# Patient Record
Sex: FEMALE | Race: WHITE | NOT HISPANIC OR LATINO | Employment: OTHER | URBAN - NONMETROPOLITAN AREA
[De-identification: names, ages, dates, MRNs, and addresses within clinical notes are randomized per-mention and may not be internally consistent; named-entity substitution may affect disease eponyms.]

---

## 2017-06-24 ENCOUNTER — HOSPITAL ENCOUNTER (EMERGENCY)
Facility: HOSPITAL | Age: 67
Discharge: HOME | End: 2017-06-25
Attending: EMERGENCY MEDICINE | Admitting: EMERGENCY MEDICINE

## 2017-06-24 ENCOUNTER — APPOINTMENT (OUTPATIENT)
Dept: GENERAL RADIOLOGY | Facility: HOSPITAL | Age: 67
End: 2017-06-24

## 2017-06-24 DIAGNOSIS — I10 ESSENTIAL HYPERTENSION: Primary | ICD-10-CM

## 2017-06-24 DIAGNOSIS — F41.9 ANXIETY: ICD-10-CM

## 2017-06-24 DIAGNOSIS — R07.89 ATYPICAL CHEST PAIN: ICD-10-CM

## 2017-06-24 LAB
ALBUMIN SERPL-MCNC: 4.4 G/DL (ref 3.4–4.8)
ALBUMIN/GLOB SERPL: 1.6 G/DL (ref 1.1–1.8)
ALP SERPL-CCNC: 104 U/L (ref 38–126)
ALT SERPL W P-5'-P-CCNC: 52 U/L (ref 9–52)
ANION GAP SERPL CALCULATED.3IONS-SCNC: 11 MMOL/L (ref 5–15)
AST SERPL-CCNC: 36 U/L (ref 14–36)
BACTERIA UR QL AUTO: ABNORMAL /HPF
BASOPHILS # BLD AUTO: 0.02 10*3/MM3 (ref 0–0.2)
BASOPHILS NFR BLD AUTO: 0.3 % (ref 0–2)
BILIRUB SERPL-MCNC: 0.6 MG/DL (ref 0.2–1.3)
BILIRUB UR QL STRIP: NEGATIVE
BUN BLD-MCNC: 15 MG/DL (ref 7–21)
BUN/CREAT SERPL: 22.4 (ref 7–25)
CALCIUM SPEC-SCNC: 9.6 MG/DL (ref 8.4–10.2)
CHLORIDE SERPL-SCNC: 101 MMOL/L (ref 95–110)
CK MB SERPL-CCNC: 1.12 NG/ML (ref 0–5)
CK MB SERPL-CCNC: 1.41 NG/ML (ref 0–5)
CK SERPL-CCNC: 50 U/L (ref 30–135)
CK SERPL-CCNC: 58 U/L (ref 30–135)
CLARITY UR: CLEAR
CO2 SERPL-SCNC: 29 MMOL/L (ref 22–31)
COLOR UR: YELLOW
CREAT BLD-MCNC: 0.67 MG/DL (ref 0.5–1)
DEPRECATED RDW RBC AUTO: 42.6 FL (ref 36.4–46.3)
EOSINOPHIL # BLD AUTO: 0.11 10*3/MM3 (ref 0–0.7)
EOSINOPHIL NFR BLD AUTO: 1.8 % (ref 0–7)
ERYTHROCYTE [DISTWIDTH] IN BLOOD BY AUTOMATED COUNT: 13.1 % (ref 11.5–14.5)
GFR SERPL CREATININE-BSD FRML MDRD: 88 ML/MIN/1.73 (ref 45–104)
GLOBULIN UR ELPH-MCNC: 2.8 GM/DL (ref 2.3–3.5)
GLUCOSE BLD-MCNC: 91 MG/DL (ref 60–100)
GLUCOSE UR STRIP-MCNC: NEGATIVE MG/DL
HCT VFR BLD AUTO: 39.7 % (ref 35–45)
HGB BLD-MCNC: 13.4 G/DL (ref 12–15.5)
HGB UR QL STRIP.AUTO: NEGATIVE
HOLD SPECIMEN: NORMAL
HOLD SPECIMEN: NORMAL
HYALINE CASTS UR QL AUTO: ABNORMAL /LPF
IMM GRANULOCYTES # BLD: 0.02 10*3/MM3 (ref 0–0.02)
IMM GRANULOCYTES NFR BLD: 0.3 % (ref 0–0.5)
INR PPP: 0.91 (ref 0.8–1.2)
KETONES UR QL STRIP: NEGATIVE
LEUKOCYTE ESTERASE UR QL STRIP.AUTO: ABNORMAL
LIPASE SERPL-CCNC: 61 U/L (ref 23–300)
LYMPHOCYTES # BLD AUTO: 1.09 10*3/MM3 (ref 0.6–4.2)
LYMPHOCYTES NFR BLD AUTO: 17.4 % (ref 10–50)
MAGNESIUM SERPL-MCNC: 2.1 MG/DL (ref 1.6–2.3)
MCH RBC QN AUTO: 30 PG (ref 26.5–34)
MCHC RBC AUTO-ENTMCNC: 33.8 G/DL (ref 31.4–36)
MCV RBC AUTO: 89 FL (ref 80–98)
MONOCYTES # BLD AUTO: 0.43 10*3/MM3 (ref 0–0.9)
MONOCYTES NFR BLD AUTO: 6.8 % (ref 0–12)
NEUTROPHILS # BLD AUTO: 4.61 10*3/MM3 (ref 2–8.6)
NEUTROPHILS NFR BLD AUTO: 73.4 % (ref 37–80)
NITRITE UR QL STRIP: NEGATIVE
NT-PROBNP SERPL-MCNC: 168 PG/ML (ref 0–900)
PH UR STRIP.AUTO: 7 [PH] (ref 5–9)
PLATELET # BLD AUTO: 336 10*3/MM3 (ref 150–450)
PMV BLD AUTO: 10.7 FL (ref 8–12)
POTASSIUM BLD-SCNC: 3.8 MMOL/L (ref 3.5–5.1)
PROT SERPL-MCNC: 7.2 G/DL (ref 6.3–8.6)
PROT UR QL STRIP: NEGATIVE
PROTHROMBIN TIME: 12.1 SECONDS (ref 11.1–15.3)
RBC # BLD AUTO: 4.46 10*6/MM3 (ref 3.77–5.16)
RBC # UR: ABNORMAL /HPF
REF LAB TEST METHOD: ABNORMAL
SODIUM BLD-SCNC: 141 MMOL/L (ref 137–145)
SP GR UR STRIP: 1 (ref 1–1.03)
SQUAMOUS #/AREA URNS HPF: ABNORMAL /HPF
TROPONIN I SERPL-MCNC: <0.012 NG/ML
TROPONIN I SERPL-MCNC: <0.012 NG/ML
TSH SERPL DL<=0.05 MIU/L-ACNC: 3.47 MIU/ML (ref 0.46–4.68)
UROBILINOGEN UR QL STRIP: ABNORMAL
WBC NRBC COR # BLD: 6.28 10*3/MM3 (ref 3.2–9.8)
WBC UR QL AUTO: ABNORMAL /HPF
WHOLE BLOOD HOLD SPECIMEN: NORMAL
WHOLE BLOOD HOLD SPECIMEN: NORMAL

## 2017-06-24 PROCEDURE — 85025 COMPLETE CBC W/AUTO DIFF WBC: CPT | Performed by: EMERGENCY MEDICINE

## 2017-06-24 PROCEDURE — 82553 CREATINE MB FRACTION: CPT | Performed by: EMERGENCY MEDICINE

## 2017-06-24 PROCEDURE — 83690 ASSAY OF LIPASE: CPT | Performed by: EMERGENCY MEDICINE

## 2017-06-24 PROCEDURE — 99284 EMERGENCY DEPT VISIT MOD MDM: CPT

## 2017-06-24 PROCEDURE — 82550 ASSAY OF CK (CPK): CPT | Performed by: EMERGENCY MEDICINE

## 2017-06-24 PROCEDURE — 83735 ASSAY OF MAGNESIUM: CPT | Performed by: EMERGENCY MEDICINE

## 2017-06-24 PROCEDURE — 84484 ASSAY OF TROPONIN QUANT: CPT | Performed by: EMERGENCY MEDICINE

## 2017-06-24 PROCEDURE — 93010 ELECTROCARDIOGRAM REPORT: CPT | Performed by: INTERNAL MEDICINE

## 2017-06-24 PROCEDURE — 84443 ASSAY THYROID STIM HORMONE: CPT | Performed by: EMERGENCY MEDICINE

## 2017-06-24 PROCEDURE — 83880 ASSAY OF NATRIURETIC PEPTIDE: CPT | Performed by: EMERGENCY MEDICINE

## 2017-06-24 PROCEDURE — 85610 PROTHROMBIN TIME: CPT | Performed by: EMERGENCY MEDICINE

## 2017-06-24 PROCEDURE — 71010 HC CHEST PA OR AP: CPT

## 2017-06-24 PROCEDURE — 81001 URINALYSIS AUTO W/SCOPE: CPT | Performed by: EMERGENCY MEDICINE

## 2017-06-24 PROCEDURE — 80053 COMPREHEN METABOLIC PANEL: CPT | Performed by: EMERGENCY MEDICINE

## 2017-06-24 PROCEDURE — 93005 ELECTROCARDIOGRAM TRACING: CPT | Performed by: EMERGENCY MEDICINE

## 2017-06-24 PROCEDURE — 87086 URINE CULTURE/COLONY COUNT: CPT | Performed by: EMERGENCY MEDICINE

## 2017-06-24 RX ORDER — NITROGLYCERIN 0.4 MG/1
0.4 TABLET SUBLINGUAL
Status: DISCONTINUED | OUTPATIENT
Start: 2017-06-24 | End: 2017-06-25 | Stop reason: HOSPADM

## 2017-06-24 RX ORDER — LISINOPRIL 5 MG/1
5 TABLET ORAL DAILY
Qty: 30 TABLET | Refills: 0 | Status: SHIPPED | OUTPATIENT
Start: 2017-06-24

## 2017-06-24 RX ORDER — NITROGLYCERIN 0.4 MG/1
0.4 TABLET SUBLINGUAL
Qty: 100 TABLET | Refills: 0 | Status: SHIPPED | OUTPATIENT
Start: 2017-06-24

## 2017-06-24 RX ORDER — CYCLOBENZAPRINE HCL 10 MG
10 TABLET ORAL EVERY 6 HOURS
COMMUNITY

## 2017-06-24 RX ORDER — ASPIRIN 325 MG
325 TABLET ORAL ONCE
Status: COMPLETED | OUTPATIENT
Start: 2017-06-24 | End: 2017-06-24

## 2017-06-24 RX ORDER — TRIAMTERENE AND HYDROCHLOROTHIAZIDE 37.5; 25 MG/1; MG/1
1 CAPSULE ORAL DAILY
COMMUNITY
Start: 2016-08-09 | End: 2017-08-05

## 2017-06-24 RX ORDER — HYDROCODONE BITARTRATE AND ACETAMINOPHEN 7.5; 325 MG/1; MG/1
2 TABLET ORAL AS NEEDED
COMMUNITY
Start: 2017-03-22

## 2017-06-24 RX ORDER — CLONIDINE HYDROCHLORIDE 0.1 MG/1
0.1 TABLET ORAL ONCE
Status: COMPLETED | OUTPATIENT
Start: 2017-06-24 | End: 2017-06-24

## 2017-06-24 RX ORDER — SODIUM CHLORIDE 0.9 % (FLUSH) 0.9 %
10 SYRINGE (ML) INJECTION AS NEEDED
Status: DISCONTINUED | OUTPATIENT
Start: 2017-06-24 | End: 2017-06-25 | Stop reason: HOSPADM

## 2017-06-24 RX ADMIN — CLONIDINE HYDROCHLORIDE 0.1 MG: 0.1 TABLET ORAL at 19:46

## 2017-06-24 RX ADMIN — ASPIRIN 325 MG: 325 TABLET, COATED ORAL at 20:11

## 2017-06-24 RX ADMIN — NITROGLYCERIN 1 INCH: 20 OINTMENT TOPICAL at 19:46

## 2017-06-25 VITALS
DIASTOLIC BLOOD PRESSURE: 63 MMHG | TEMPERATURE: 98 F | OXYGEN SATURATION: 97 % | HEIGHT: 65 IN | RESPIRATION RATE: 18 BRPM | WEIGHT: 150 LBS | BODY MASS INDEX: 24.99 KG/M2 | SYSTOLIC BLOOD PRESSURE: 116 MMHG | HEART RATE: 70 BPM

## 2017-06-25 NOTE — ED PROVIDER NOTES
Subjective   HPI Comments: Patient came complaining of heaviness on her chest and her face is flush he is shaky her blood pressure slightly elevated is also complaining of generalized weakness    Allergic to penicillin     medications at home: Dyazide, Naprosyn, Norco, Flexeril and Nexium    Medical history: Hypertension    Surgical history: Back surgery bilateral tubal ligation and tonsillectomy and breast surgery GERD    Never smoker    She uses alcohol      History provided by:  Patient      Review of Systems   Constitutional: Negative for activity change, appetite change, fatigue and fever.   HENT: Negative for congestion, facial swelling, mouth sores, nosebleeds, sore throat and trouble swallowing.    Eyes: Negative for discharge, redness and itching.   Respiratory: Negative for apnea, cough and wheezing.    Cardiovascular: Positive for chest pain. Negative for palpitations.   Gastrointestinal: Negative for blood in stool and nausea.   Endocrine: Negative for cold intolerance, heat intolerance, polydipsia, polyphagia and polyuria.   Genitourinary: Negative for difficulty urinating, dysuria, flank pain, frequency and hematuria.   Musculoskeletal: Negative for gait problem, joint swelling and neck pain.   Skin: Negative.  Negative for color change, pallor and rash.   Allergic/Immunologic: Negative for environmental allergies.   Neurological: Negative for dizziness, seizures, syncope, speech difficulty, light-headedness, numbness and headaches.   Hematological: Negative for adenopathy.   Psychiatric/Behavioral: Negative for agitation, behavioral problems, confusion and sleep disturbance. The patient is nervous/anxious.        Past Medical History:   Diagnosis Date   • Hypertension        Allergies   Allergen Reactions   • Penicillins        Past Surgical History:   Procedure Laterality Date   • BACK SURGERY     • BREAST SURGERY      2 benign breast lumps removed   • TONSILLECTOMY     • TUBAL ABDOMINAL LIGATION          Family History   Problem Relation Age of Onset   • Hyperlipidemia Mother    • Prostate cancer Father        Social History     Social History   • Marital status:      Spouse name: N/A   • Number of children: N/A   • Years of education: N/A     Social History Main Topics   • Smoking status: Never Smoker   • Smokeless tobacco: None   • Alcohol use Yes   • Drug use: Defer   • Sexual activity: Defer     Other Topics Concern   • None     Social History Narrative   • None           Objective   Physical Exam   Constitutional: She is oriented to person, place, and time. She appears well-developed and well-nourished.   HENT:   Head: Normocephalic and atraumatic.   Nose: Nose normal.   Mouth/Throat: Oropharynx is clear and moist.   Eyes: Conjunctivae and EOM are normal. Pupils are equal, round, and reactive to light.   Neck: Normal range of motion. Neck supple.   Cardiovascular: Normal rate, regular rhythm, normal heart sounds and intact distal pulses.    Pulmonary/Chest: Effort normal and breath sounds normal.   Abdominal: Soft. Bowel sounds are normal.   Musculoskeletal: Normal range of motion.   Neurological: She is alert and oriented to person, place, and time.   Skin: Skin is warm and dry.   Psychiatric: She has a normal mood and affect. Her behavior is normal. Judgment and thought content normal.   Nursing note and vitals reviewed.      Procedures         ED Course  ED Course   Comment By Time   Patient has been fine since she arrived to the emergency room there is no chest pain now no shortness of breath and all her tests are okay so will get her ready to go home Diomedes Lema MD 06/24 1835      Labs Reviewed   URINALYSIS W/ CULTURE IF INDICATED - Abnormal; Notable for the following:        Result Value    Leuk Esterase, UA Small (1+) (*)     All other components within normal limits   URINALYSIS, MICROSCOPIC ONLY - Abnormal; Notable for the following:     RBC, UA 0-2 (*)     All other components within  normal limits   TROPONIN (IN-HOUSE) - Normal   TROPONIN (IN-HOUSE) - Normal   CK - Normal   CK - Normal   CK MB - Normal   CK MB - Normal   PROTIME-INR - Normal    Narrative:     Therapeutic range for most indications is 2.0-3.0 INR,  or 2.5-3.5 for mechanical heart valves.   COMPREHENSIVE METABOLIC PANEL - Normal   BNP (IN-HOUSE) - Normal   CBC WITH AUTO DIFFERENTIAL - Normal   LIPASE - Normal   TSH - Normal   MAGNESIUM - Normal   URINE CULTURE   RAINBOW DRAW    Narrative:     The following orders were created for panel order [place] Draw.  Procedure                               Abnormality         Status                     ---------                               -----------         ------                     Light Blue Top[[id number]]                                   Final result               Green Top (Gel)[[id number]]                                  Final result               Lavender Top[[id number]]                                     Final result               Gold Top - SST[[id number]]                                   Final result                 Please view results for these tests on the individual orders.   TROPONIN (IN-HOUSE)   CK   CK MB   CBC AND DIFFERENTIAL    Narrative:     The following orders were created for panel order CBC & Differential.  Procedure                               Abnormality         Status                     ---------                               -----------         ------                     CBC Auto Differential[[id number]]        Normal              Final result                 Please view results for these tests on the individual orders.   LIGHT BLUE TOP   GREEN TOP   LAVENDER TOP   GOLD TOP - SST        XR Chest 1 View   Final Result   Mild cardiomegaly without radiographic evidence of   acute cardiopulmonary disease.      Electronically signed by:  Marilyn Ferrer MD  6/24/2017 8:16 PM CDT   Workstation: [employer]    Final diagnoses:   Essential  hypertension   Atypical chest pain   Anxiety            Diomedes Lema MD  06/24/17 [id number]

## 2017-06-26 LAB — BACTERIA SPEC AEROBE CULT: NORMAL

## 2018-03-19 RX ORDER — ESCITALOPRAM OXALATE 10 MG/1
10 TABLET ORAL DAILY
COMMUNITY

## 2018-03-19 RX ORDER — LOVASTATIN 10 MG/1
10 TABLET ORAL NIGHTLY
COMMUNITY

## 2018-03-19 RX ORDER — RANITIDINE 150 MG/1
150 TABLET ORAL DAILY
COMMUNITY

## 2018-03-19 RX ORDER — TRIAMTERENE AND HYDROCHLOROTHIAZIDE 37.5; 25 MG/1; MG/1
1 TABLET ORAL DAILY
COMMUNITY

## 2018-03-19 RX ORDER — TRAMADOL HYDROCHLORIDE 50 MG/1
50 TABLET ORAL NIGHTLY
COMMUNITY

## 2018-03-19 RX ORDER — ASPIRIN 81 MG/1
81 TABLET ORAL DAILY
COMMUNITY

## 2018-03-26 ENCOUNTER — ANESTHESIA (OUTPATIENT)
Dept: GASTROENTEROLOGY | Facility: HOSPITAL | Age: 68
End: 2018-03-26

## 2018-03-26 ENCOUNTER — HOSPITAL ENCOUNTER (OUTPATIENT)
Facility: HOSPITAL | Age: 68
Setting detail: HOSPITAL OUTPATIENT SURGERY
Discharge: HOME | End: 2018-03-26
Attending: INTERNAL MEDICINE | Admitting: INTERNAL MEDICINE

## 2018-03-26 ENCOUNTER — ANESTHESIA EVENT (OUTPATIENT)
Dept: GASTROENTEROLOGY | Facility: HOSPITAL | Age: 68
End: 2018-03-26

## 2018-03-26 VITALS
BODY MASS INDEX: 25.49 KG/M2 | TEMPERATURE: 97.7 F | RESPIRATION RATE: 20 BRPM | HEART RATE: 71 BPM | HEIGHT: 65 IN | SYSTOLIC BLOOD PRESSURE: 121 MMHG | WEIGHT: 153 LBS | OXYGEN SATURATION: 99 % | DIASTOLIC BLOOD PRESSURE: 71 MMHG

## 2018-03-26 DIAGNOSIS — Z12.11 ENCOUNTER FOR SCREENING COLONOSCOPY: ICD-10-CM

## 2018-03-26 DIAGNOSIS — K21.9 GASTRIC REFLUX: ICD-10-CM

## 2018-03-26 PROCEDURE — 88305 TISSUE EXAM BY PATHOLOGIST: CPT | Performed by: INTERNAL MEDICINE

## 2018-03-26 PROCEDURE — 25010000002 PROPOFOL 10 MG/ML EMULSION: Performed by: NURSE ANESTHETIST, CERTIFIED REGISTERED

## 2018-03-26 PROCEDURE — 88305 TISSUE EXAM BY PATHOLOGIST: CPT | Performed by: PATHOLOGY

## 2018-03-26 RX ORDER — PROMETHAZINE HYDROCHLORIDE 25 MG/1
25 SUPPOSITORY RECTAL ONCE AS NEEDED
Status: DISCONTINUED | OUTPATIENT
Start: 2018-03-26 | End: 2018-03-26 | Stop reason: HOSPADM

## 2018-03-26 RX ORDER — PROMETHAZINE HYDROCHLORIDE 25 MG/ML
12.5 INJECTION, SOLUTION INTRAMUSCULAR; INTRAVENOUS ONCE AS NEEDED
Status: DISCONTINUED | OUTPATIENT
Start: 2018-03-26 | End: 2018-03-26 | Stop reason: HOSPADM

## 2018-03-26 RX ORDER — PROMETHAZINE HYDROCHLORIDE 25 MG/1
25 TABLET ORAL ONCE AS NEEDED
Status: DISCONTINUED | OUTPATIENT
Start: 2018-03-26 | End: 2018-03-26 | Stop reason: HOSPADM

## 2018-03-26 RX ORDER — LIDOCAINE HYDROCHLORIDE 10 MG/ML
INJECTION, SOLUTION INFILTRATION; PERINEURAL AS NEEDED
Status: DISCONTINUED | OUTPATIENT
Start: 2018-03-26 | End: 2018-03-26 | Stop reason: SURG

## 2018-03-26 RX ORDER — PROPOFOL 10 MG/ML
VIAL (ML) INTRAVENOUS AS NEEDED
Status: DISCONTINUED | OUTPATIENT
Start: 2018-03-26 | End: 2018-03-26 | Stop reason: SURG

## 2018-03-26 RX ORDER — ONDANSETRON 2 MG/ML
4 INJECTION INTRAMUSCULAR; INTRAVENOUS ONCE AS NEEDED
Status: DISCONTINUED | OUTPATIENT
Start: 2018-03-26 | End: 2018-03-26 | Stop reason: HOSPADM

## 2018-03-26 RX ORDER — DEXTROSE AND SODIUM CHLORIDE 5; .45 G/100ML; G/100ML
30 INJECTION, SOLUTION INTRAVENOUS CONTINUOUS
Status: DISCONTINUED | OUTPATIENT
Start: 2018-03-26 | End: 2018-03-26 | Stop reason: HOSPADM

## 2018-03-26 RX ADMIN — PROPOFOL 40 MG: 10 INJECTION, EMULSION INTRAVENOUS at 15:03

## 2018-03-26 RX ADMIN — PROPOFOL 40 MG: 10 INJECTION, EMULSION INTRAVENOUS at 15:08

## 2018-03-26 RX ADMIN — LIDOCAINE HYDROCHLORIDE 40 MG: 10 INJECTION, SOLUTION INFILTRATION; PERINEURAL at 14:51

## 2018-03-26 RX ADMIN — PROPOFOL 80 MG: 10 INJECTION, EMULSION INTRAVENOUS at 14:51

## 2018-03-26 RX ADMIN — DEXTROSE AND SODIUM CHLORIDE 30 ML/HR: 5; 450 INJECTION, SOLUTION INTRAVENOUS at 13:51

## 2018-03-26 RX ADMIN — PROPOFOL 20 MG: 10 INJECTION, EMULSION INTRAVENOUS at 14:53

## 2018-03-26 RX ADMIN — PROPOFOL 50 MG: 10 INJECTION, EMULSION INTRAVENOUS at 14:56

## 2018-03-26 RX ADMIN — PROPOFOL 50 MG: 10 INJECTION, EMULSION INTRAVENOUS at 14:55

## 2018-03-26 NOTE — H&P
Arabella Cooper DO,[facility]  Gastroenterology  Hepatology  Endoscopy  Board Certified in Internal Medicine and gastroenterology  [address], [address]  [place]. [address]  T- [phone]   F - [phone]     GASTROENTEROLOGY HISTORY AND PHYSICAL  NOTE   ARABELLA COOPER DO.         SUBJECTIVE:   3/26/2018    Name: Kathleen Joseph  DOD: 1950      Chief Complaint:     Subjective : Acid reflux.  Screening examination for routine risk for colon cancer    Patient is 67 y.o. female presents with desire for elective EGD and colonoscopy.      ROS/HISTORY/ CURRENT MEDICATIONS/OBJECTIVE/VS/PE:   Review of Systems:   Review of Systems    History:     Past Medical History:   Diagnosis Date   • Hypertension      Past Surgical History:   Procedure Laterality Date   • BACK SURGERY     • BREAST SURGERY      2 benign breast lumps removed   • TONSILLECTOMY     • TUBAL ABDOMINAL LIGATION       Family History   Problem Relation Age of Onset   • Hyperlipidemia Mother    • Prostate cancer Father      Social History   Substance Use Topics   • Smoking status: Never Smoker   • Smokeless tobacco: Never Used   • Alcohol use 0.6 oz/week     1 Glasses of wine per week     Prescriptions Prior to Admission   Medication Sig Dispense Refill Last Dose   • aspirin 81 MG EC tablet Take 81 mg by mouth Daily.   3/23/2018   • cyclobenzaprine (FLEXERIL) 10 MG tablet Take 10 mg by mouth Every 6 (Six) Hours.   3/25/2018 at Unknown time   • escitalopram (LEXAPRO) 10 MG tablet Take 10 mg by mouth Daily.   3/25/2018 at Unknown time   • Esomeprazole Magnesium (NEXIUM PO) Take 40 mg by mouth Daily.   3/25/2018 at Unknown time   • HYDROcodone-acetaminophen (NORCO) 7.5-325 MG per tablet Take 2 tablets by mouth As Needed.   3/25/2018 at Unknown time   • lisinopril (PRINIVIL,ZESTRIL) 5 MG tablet Take 1 tablet by mouth Daily. 30 tablet 0 3/25/2018 at Unknown time   • lovastatin (MEVACOR) 10 MG tablet Take 10 mg by mouth Every Night.    3/25/2018 at Unknown time   • nitroglycerin (NITROSTAT) 0.4 MG SL tablet Place 1 tablet under the tongue Every 5 (Five) Minutes As Needed for Chest Pain. Take no more than 3 doses in 15 minutes. 100 tablet 0 3/25/2018 at Unknown time   • raNITIdine (ZANTAC) 150 MG tablet Take 150 mg by mouth Daily.   3/25/2018 at Unknown time   • traMADol (ULTRAM) 50 MG tablet Take 50 mg by mouth Every Night.   3/25/2018 at Unknown time   • triamterene-hydrochlorothiazide (MAXZIDE-25) 37.5-25 MG per tablet Take 1 tablet by mouth Daily.   3/25/2018 at Unknown time     Allergies:  Penicillins    I have reviewed the patients medical history, surgical history and family history in the available medical record system.     Current Medications:     Current Facility-Administered Medications   Medication Dose Route Frequency Provider Last Rate Last Dose   • dextrose 5 % and sodium chloride 0.45 % infusion  30 mL/hr Intravenous Continuous Humza Davis DO 30 mL/hr at 03/26/18 1351 30 mL/hr at 03/26/18 1351       Objective     Physical Exam:   Temp:  [97.6 °F (36.4 °C)] 97.6 °F (36.4 °C)  Heart Rate:  [71] 71  Resp:  [20] 20  BP: (152)/(87) 152/87    Physical Exam:  General Appearance:    Alert, cooperative, in no acute distress   Head:    Normocephalic, without obvious abnormality, atraumatic   Eyes:            Lids and lashes normal, conjunctivae and sclerae normal, no   icterus, no pallor, corneas clear, PERRLA   Ears:    Ears appear intact with no abnormalities noted   Throat:   No oral lesions, no thrush, oral mucosa moist   Neck:   No adenopathy, supple, trachea midline, no thyromegaly, no     carotid bruit, no JVD   Back:     No kyphosis present, no scoliosis present, no skin lesions,       erythema or scars, no tenderness to percussion or                   palpation,   range of motion normal   Lungs:     Clear to auscultation,respirations regular, even and                   unlabored    Heart:    Regular rhythm and normal rate,  normal S1 and S2, no            murmur, no gallop, no rub, no click   Breast Exam:    Deferred   Abdomen:     Normal bowel sounds, no masses, no organomegaly, soft        non-tender, non-distended, no guarding, no rebound                 tenderness   Genitalia:    Deferred   Extremities:   Moves all extremities well, no edema, no cyanosis, no              redness   Pulses:   Pulses palpable and equal bilaterally   Skin:   No bleeding, bruising or rash   Lymph nodes:   No palpable adenopathy   Neurologic:   Cranial nerves 2 - 12 grossly intact, sensation intact, DTR        present and equal bilaterally      Results Review:     Lab Results   Component Value Date    WBC 6.28 06/24/2017    WBC 5.2 02/20/2014    HGB 13.4 06/24/2017    HGB 13.5 02/20/2014    HCT 39.7 06/24/2017    HCT 41.4 02/20/2014     06/24/2017     02/20/2014             No results found for: LIPASE  Lab Results   Component Value Date    INR 0.91 06/24/2017          Radiology Review:  Imaging Results (last 72 hours)     ** No results found for the last 72 hours. **           I reviewed the patient's new clinical results.  I reviewed the patient's new imaging results and agree with the interpretation.     ASSESSMENT/PLAN:   ASSESSMENT:   1.  Acid reflux  2.  Screening examination for routine risk for colon cancer    PLAN:   1.  Esophagogastroduodenoscopy with biopsies  2.  Colonoscopy    Risk and benefits associated with procedure are reviewed with the patient.  The patient wishes to proceed      Humza Davis DO  03/26/18  2:06 PM

## 2018-03-26 NOTE — ANESTHESIA POSTPROCEDURE EVALUATION
Patient: Kathleen Joseph    Procedure Summary     Date:  03/26/18 Room / Location:  [facility] ENDOSCOPY 2 / [facility] ENDOSCOPY    Anesthesia Start:  1445 Anesthesia Stop:  1512    Procedures:       ESOPHAGOGASTRODUODENOSCOPY (N/A Esophagus)      COLONOSCOPY (N/A ) Diagnosis:       Encounter for screening colonoscopy      Gastric reflux      (Encounter for screening colonoscopy [Z12.11])      (Gastric reflux [K21.9])    Surgeon:  Humza Davis DO Provider:  Elsi Zazueta CRNA    Anesthesia Type:  MAC ASA Status:  2          Anesthesia Type: MAC  Last vitals  BP   152/87 (03/26/18 1338)   Temp   97.6 °F (36.4 °C) (03/26/18 1338)   Pulse   71 (03/26/18 1338)   Resp   20 (03/26/18 1338)     SpO2   95 % (03/26/18 1338)     Post Anesthesia Care and Evaluation    Patient location during evaluation: bedside  Patient participation: complete - patient participated  Level of consciousness: responsive to verbal stimuli  Pain management: adequate  Airway patency: patent  Anesthetic complications: No anesthetic complications    Cardiovascular status: acceptable  Respiratory status: acceptable  Hydration status: acceptable

## 2018-03-26 NOTE — ANESTHESIA PREPROCEDURE EVALUATION
Anesthesia Evaluation     NPO Solid Status: N/A  NPO Liquid Status: > 6 hours           Airway   Mallampati: II  TM distance: <3 FB  Neck ROM: full  No difficulty expected  Dental - normal exam     Pulmonary - normal exam   Cardiovascular - normal exam    (+) hyperlipidemia,       Neuro/Psych  (+) psychiatric history Depression,     GI/Hepatic/Renal/Endo    (+)  GERD,      Musculoskeletal     Abdominal  - normal exam   Substance History      OB/GYN          Other                        Anesthesia Plan    ASA 2     MAC     intravenous induction   Anesthetic plan and risks discussed with patient.

## 2018-03-28 LAB
LAB AP CASE REPORT: NORMAL
Lab: NORMAL
PATH REPORT.FINAL DX SPEC: NORMAL
PATH REPORT.GROSS SPEC: NORMAL

## 2018-08-30 ENCOUNTER — TRANSCRIBE ORDERS (OUTPATIENT)
Dept: GENERAL RADIOLOGY | Facility: CLINIC | Age: 68
End: 2018-08-30

## 2018-08-30 DIAGNOSIS — S20.219A RIB CONTUSION, UNSPECIFIED LATERALITY, INITIAL ENCOUNTER: ICD-10-CM

## 2018-08-30 DIAGNOSIS — M79.602 LEFT ARM PAIN: ICD-10-CM

## 2018-08-30 DIAGNOSIS — M25.512 LEFT SHOULDER PAIN, UNSPECIFIED CHRONICITY: Primary | ICD-10-CM

## 2018-09-05 ENCOUNTER — TRANSCRIBE ORDERS (OUTPATIENT)
Dept: PHYSICAL THERAPY | Facility: HOSPITAL | Age: 68
End: 2018-09-05

## 2018-09-05 DIAGNOSIS — M25.512 LEFT SHOULDER PAIN, UNSPECIFIED CHRONICITY: Primary | ICD-10-CM

## 2018-09-06 ENCOUNTER — APPOINTMENT (OUTPATIENT)
Dept: PHYSICAL THERAPY | Facility: HOSPITAL | Age: 68
End: 2018-09-06

## 2018-09-10 ENCOUNTER — HOSPITAL ENCOUNTER (OUTPATIENT)
Dept: PHYSICAL THERAPY | Facility: HOSPITAL | Age: 68
Setting detail: THERAPIES SERIES
Discharge: HOME | End: 2018-09-10

## 2018-09-10 DIAGNOSIS — M25.512 ACUTE PAIN OF LEFT SHOULDER: Primary | ICD-10-CM

## 2018-09-10 PROCEDURE — 97140 MANUAL THERAPY 1/> REGIONS: CPT

## 2018-09-10 PROCEDURE — 97161 PT EVAL LOW COMPLEX 20 MIN: CPT

## 2018-09-10 PROCEDURE — 97110 THERAPEUTIC EXERCISES: CPT

## 2018-09-10 PROCEDURE — G0283 ELEC STIM OTHER THAN WOUND: HCPCS

## 2018-09-10 NOTE — THERAPY EVALUATION
Outpatient Physical Therapy Ortho Initial Evaluation  [facility]     Patient Name: Kathleen Joseph  : 1950  MRN: [id number]  Today's Date: 9/10/2018      Visit Date: 09/10/2018    There is no problem list on file for this patient.       Past Medical History:   Diagnosis Date   • Hypertension         Past Surgical History:   Procedure Laterality Date   • BACK SURGERY     • BREAST SURGERY      2 benign breast lumps removed   • COLONOSCOPY N/A 3/26/2018    Procedure: COLONOSCOPY;  Surgeon: Humza Davis DO;  Location: [facility] ENDOSCOPY;  Service: Gastroenterology   • ENDOSCOPY N/A 3/26/2018    Procedure: ESOPHAGOGASTRODUODENOSCOPY;  Surgeon: Humza Davis DO;  Location: [facility] ENDOSCOPY;  Service: Gastroenterology   • TONSILLECTOMY     • TUBAL ABDOMINAL LIGATION         Visit Dx:     ICD-10-CM ICD-9-CM   1. Acute pain of left shoulder M25.512 719.41             Patient History     Row Name 09/10/18 1300             History    Chief Complaint Pain  -MS      Type of Pain Shoulder pain;Chest pain  -MS      Date Current Problem(s) Began 18  -MS      Brief Description of Current Complaint Patient is referred to physical therapy with left shoulder pain which began when she fell on concrete on her left side as she was leaving work and tripped on a rug. Patient landed on the back of her left upper arm and chest predominantly. Patient reports that this area still hurts when she breathes deeply and she still cannot sleep on that side (her left side). Patient filled out an incident report at work but did not go to the doctor for about a week when she realized that it was not getting better. Patient continues to work on light duty with no use of her left arm and limited use of right (and no bending or stretching) as per her doctor. Patient normally works as . X-rays were negative for any fractures  -MS      Previous treatment for THIS PROBLEM Pain Management  -MS       Patient/Caregiver Goals Relieve pain;Return to prior level of function;Return to work  -MS      Current Tobacco Use no  -MS      Patient's Rating of General Health Good  -MS      Hand Dominance right-handed  -MS      Occupation/sports/leisure activities sewing; arts and crafts  -MS      Patient seeing anyone else for problem(s)? no  -MS      How has patient tried to help current problem? ice; take prescribed meds including Norco, steroids, muscle relaxer  -MS      What clinical tests have you had for this problem? X-ray   arm and chest; nothing broken  -MS      Related/Recent Hospitalizations No  -MS      Surgery/Hospitalization back surgery March 2017 for herniatd disk  -MS      Are you or can you be pregnant No  -MS         Pain     Pain Location Chest;Arm  -MS      Pain at Present 4  -MS      Pain at Best 1  -MS      Pain at Worst 7   worse in the am  -MS      Pain Frequency Intermittent  -MS      Pain Description Aching;Dull;Stabbing  -MS      What Performance Factors Make the Current Problem(s) WORSE? ice; meds (norco)  -MS      What Performance Factors Make the Current Problem(s) BETTER? lying on LS  -MS      Is your sleep disturbed? Yes  -MS      Is medication used to assist with sleep? Yes  -MS      Total hours of sleep per night 5   needs to ice during the night at times  -MS      What position do you sleep in? Right sidelying  -MS      Difficulties at work? limited duty  -MS      Difficulties with ADL's?  objects; reaching up  -MS         Fall Risk Assessment    Any falls in the past year: Yes  -MS      Number of falls reported in the last 12 months 2   fell leaving work tripping rug; also slipped on ice jan17  -MS         Safety    Are you being hurt, hit, or frightened by anyone at home or in your life? No  -MS      Are you being neglected by a caregiver No  -MS        User Key  (r) = Recorded By, (t) = Taken By, (c) = Cosigned By    Initials Name Provider Type    Juan Higuera, PT  Physical Therapist                PT Ortho     Row Name 09/10/18 1300       Precautions and Contraindications    Precautions/Limitations --   no use of left arm as per doctor  -MS       Subjective Pain    Able to rate subjective pain? yes  -MS    Pre-Treatment Pain Level 4  -MS       Posture/Observations    Posture- WNL --   chin forward  -MS       Special Tests/Palpation    Special Tests/Palpation --   palpable soreness posterior L shoulder, lat D, axillary area  -MS       General ROM    GENERAL ROM COMMENTS Patient presents with right shoulder AROM WNL; left shoulder ABD is 110 degrees (with pain)  -MS       MMT (Manual Muscle Testing)    General MMT Comments right shoulder 4/5 throughout on MMT; left shoulder not measurable due to pain  -MS      User Key  (r) = Recorded By, (t) = Taken By, (c) = Cosigned By    Initials Name Provider Type    Juan Higuera, PT Physical Therapist                      Therapy Education  Education Details: Explained to patient the importance of slowly restoring ROM of her left shoulder through AAROM exercises  Given: HEP  Program: New  How Provided: Written, Verbal, Demonstration  Provided to: Patient           PT OP Goals     Row Name 09/10/18 1300          PT Short Term Goals    STG Date to Achieve 10/01/18  -MS     STG 1 patient will be independent with HEP  -MS     STG 2 patient will presents with 0/10 pain at rest or with movement  -MS     STG 3 patient will present with AROM of left shoulder WNL  -MS       User Key  (r) = Recorded By, (t) = Taken By, (c) = Cosigned By    Initials Name Provider Type    Juan Higuera, PT Physical Therapist                PT Assessment/Plan     Row Name 09/10/18 1814          PT Assessment    Functional Limitations Performance in leisure activities;Performance in work activities;Performance in self-care ADL  -MS     Impairments Muscle strength;Pain;Range of motion  -MS     Assessment Comments Patient presents status-post fall on her  left shoulder, side and axillary area which resulted in extensive soft tissue damage and bruising. Patient presents with pain during left shoulder elevation and extension which is related to the trauma in posterior deltoid and latissimus dorsi muscles in this region. Patient will benefit from skilled physical therapy modalities to redcue pain and therex to restore pain-free left shoulder function.   -MS     Please refer to paper survey for additional self-reported information Yes  -MS     Rehab Potential Excellent  -MS     Patient/caregiver participated in establishment of treatment plan and goals Yes  -MS     Patient would benefit from skilled therapy intervention Yes  -MS        PT Plan    PT Frequency 2x/week  -MS     Predicted Duration of Therapy Intervention (Therapy Eval) 4 weeks  -MS     Planned CPT's? PT EVAL LOW COMPLEXITY: 28548;PT MANUAL THERAPY EA 15 MIN: 47347;PT THER PROC EA 15 MIN: 50323;PT THER ACT EA 15 MIN: 16600;PT ELECTRICAL STIM UNATTEND: ;PT ULTRASOUND EA 15 MIN: 23522;PT HOT/COLD PACK WC NONMCARE: 38064  -MS     Physical Therapy Interventions (Optional Details) ROM (Range of Motion);strengthening;stretching;modalities  -MS     PT Plan Comments  Use modalities in early stages of care to reduce inflammation and pain, while slowly advancing therex to patient tolerance.  -MS       User Key  (r) = Recorded By, (t) = Taken By, (c) = Cosigned By    Initials Name Provider Type    MS Juan Guardado, PT Physical Therapist                Modalities     Row Name 09/10/18 1300             Ice    Ice Applied Yes  -MS      Location posterior left shoulder, upper left lat dorsi/axillary area  -MS      Rx Minutes 15 mins  -MS      Ice S/P Rx Yes  -MS      Patient denies application of Ice Yes  -MS         ELECTRICAL STIMULATION    Attended/Unattended Unattended  -MS      Stimulation Type IFC  -MS      Location/Electrode Placement/Other left posterior shoulder/ arm  -MS        User Key  (r) = Recorded  "By, (t) = Taken By, (c) = Cosigned By    Initials Name Provider Type    Juan Higuera, PT Physical Therapist              Exercises     Row Name 09/10/18 1300             Precautions    Existing Precautions/Restrictions --   lifting; left arm use  -MS         Subjective Pain    Able to rate subjective pain? yes  -MS      Pre-Treatment Pain Level 4  -MS         Exercise 1    Exercise Name 1 pulleys  -MS      Time 1 5 min  -MS         Exercise 2    Exercise Name 2 wall slides  -MS      Reps 2 10  -MS      Time 2 5\" hold  -MS      Additional Comments HEP  -MS         Exercise 3    Exercise Name 3 wand ABD L  -MS      Reps 3 10  -MS      Time 3 3\" hold  -MS      Additional Comments HEP  -MS        User Key  (r) = Recorded By, (t) = Taken By, (c) = Cosigned By    Initials Name Provider Type    Juan Higuera PT Physical Therapist           Manual Rx (last 36 hours)      Manual Treatments     Row Name 09/10/18 1300             Manual Rx 1    Manual Rx 1 Location left bposterior shoulder, left tricelps, left upper lat dorsii/axillary region  -MS      Manual Rx 1 Type STM  -MS      Manual Rx 1 Duration 8  -MS        User Key  (r) = Recorded By, (t) = Taken By, (c) = Cosigned By    Initials Name Provider Type    Juan Higuera, PT Physical Therapist                      Outcome Measure Options: Quick DASH  Quick DASH  Open a tight or new jar.: Moderate Difficulty  Do heavy household chores (e.g., wash walls, wash floors): Moderate Difficulty  Carry a shopping bag or briefcase: Moderate Difficulty  Wash your back: Moderate Difficulty  Use a knife to cut food: Moderate Difficulty  Recreational activities in which you take some force or impact through your arm, should or hand (e.g. golf, hammering, tennis, etc.): Severe Difficulty  During the past week, to what extent has your arm, shoulder, or hand problem interfered with your normal social activites with family, friends, neighbors or groups?: " Moderately  During the past week, were you limited in your work or other regular daily activities as a result of your arm, shoulder or hand problem?: Very limited  Arm, Shoulder, or hand pain: Moderate  Tingling (pins and needles) in your arm, shoulder, or hand: Mild  During the past week, how much difficulty have you had sleeping because of the pain in your arm, shoulder or hand?: Severe Difficulty  Number of Questions Answered: 11  Quick DASH Score: 54.55         Time Calculation:     Therapy Suggested Charges     Code   Minutes Charges    None             Start Time: 1300  Stop Time: 1403  Time Calculation (min): 63 min     Therapy Charges for Today     Code Description Service Date Service Provider Modifiers Qty    39932836578 HC PT ELECTRICAL STIM UNATTENDED 9/10/2018 Juan Guardado, PT  1    65264610141 HC PT MANUAL THERAPY EA 15 MIN 9/10/2018 Juan Guardado, PT GP 1    93061774601 HC PT THER PROC EA 15 MIN 9/10/2018 Juan Guardado, PT GP 1    72856409734 HC PT EVAL LOW COMPLEXITY 2 9/10/2018 Juan Guardado, PT GP 1          PT G-Codes  Outcome Measure Options: Quick DASH  Quick DASH Score: 54.55         Juan Guardado PT  9/10/2018

## 2018-09-12 ENCOUNTER — HOSPITAL ENCOUNTER (OUTPATIENT)
Dept: PHYSICAL THERAPY | Facility: HOSPITAL | Age: 68
Setting detail: THERAPIES SERIES
Discharge: HOME | End: 2018-09-12

## 2018-09-12 DIAGNOSIS — M25.512 ACUTE PAIN OF LEFT SHOULDER: Primary | ICD-10-CM

## 2018-09-12 PROCEDURE — 97110 THERAPEUTIC EXERCISES: CPT

## 2018-09-12 PROCEDURE — G0283 ELEC STIM OTHER THAN WOUND: HCPCS

## 2018-09-12 NOTE — THERAPY TREATMENT NOTE
Outpatient Physical Therapy Ortho Treatment Note  [facility]     Patient Name: Kathleen Joseph  : 1950  MRN: [id number]  Today's Date: 2018      Visit Date: 2018  Visits 2/2. Recert 10/1. MD del toro/jerrod FERNANDO. 0% improvement.   Visit Dx:    ICD-10-CM ICD-9-CM   1. Acute pain of left shoulder M25.512 719.41       There is no problem list on file for this patient.       Past Medical History:   Diagnosis Date   • Hypertension         Past Surgical History:   Procedure Laterality Date   • BACK SURGERY     • BREAST SURGERY      2 benign breast lumps removed   • COLONOSCOPY N/A 3/26/2018    Procedure: COLONOSCOPY;  Surgeon: Humza Davis DO;  Location: [facility] ENDOSCOPY;  Service: Gastroenterology   • ENDOSCOPY N/A 3/26/2018    Procedure: ESOPHAGOGASTRODUODENOSCOPY;  Surgeon: Humza Davis DO;  Location: [facility] ENDOSCOPY;  Service: Gastroenterology   • TONSILLECTOMY     • TUBAL ABDOMINAL LIGATION               PT Ortho     Row Name 09/10/18 1300       Precautions and Contraindications    Precautions/Limitations --   no use of left arm as per doctor  -MS       Subjective Pain    Able to rate subjective pain? yes  -MS    Pre-Treatment Pain Level 4  -MS       Posture/Observations    Posture- WNL --   chin forward  -MS       Special Tests/Palpation    Special Tests/Palpation --   palpable soreness posterior L shoulder, lat D, axillary area  -MS       General ROM    GENERAL ROM COMMENTS Patient presents with right shoulder AROM WNL; left shoulder ABD is 110 degrees (with pain)  -MS       MMT (Manual Muscle Testing)    General MMT Comments right shoulder 4/5 throughout on MMT; left shoulder not measurable due to pain  -MS      User Key  (r) = Recorded By, (t) = Taken By, (c) = Cosigned By    Initials Name Provider Type    Juan Higuera, PT Physical Therapist                            PT Assessment/Plan     Row Name 18 1500          PT Assessment    Functional Limitations Performance  in leisure activities;Performance in work activities;Performance in self-care ADL  -JW     Impairments Muscle strength;Pain;Range of motion  -JW     Assessment Comments Good shashank of today's ther ex with increased intensity. Shld ROM increased gradually with exercise performance.   -JW     Rehab Potential Excellent  -     Patient/caregiver participated in establishment of treatment plan and goals Yes  -JW     Patient would benefit from skilled therapy intervention Yes  -JW        PT Plan    PT Frequency 2x/week  -JW     Predicted Duration of Therapy Intervention (Therapy Eval) 4 weeks  -JW     PT Plan Comments Cont PT per POC.   -       User Key  (r) = Recorded By, (t) = Taken By, (c) = Cosigned By    Initials Name Provider Type    Obed Ledezma PTA Physical Therapy Assistant                Modalities     Row Name 09/12/18 1500             Subjective Pain    Post-Treatment Pain Level --   decreased  -         Ice    Ice Applied Yes  -      Location L ld  -      Rx Minutes 15 mins  -JW      Ice S/P Rx Yes  -JW         ELECTRICAL STIMULATION    Attended/Unattended Unattended  -      Stimulation Type IFC  -      Location/Electrode Placement/Other L [facility]      97607 - PT Electrical Stimulation (Manual) Minutes 15  -JW        User Key  (r) = Recorded By, (t) = Taken By, (c) = Cosigned By    Initials Name Provider Type    Obed Ledezma PTA Physical Therapy Assistant                Exercises     Row Name 09/12/18 1500             Subjective Comments    Subjective Comments Pt c/o pain at L post arm area.   -JW         Subjective Pain    Able to rate subjective pain? yes  -      Pre-Treatment Pain Level 4  -JW      Post-Treatment Pain Level --   decreased  -         Exercise 1    Exercise Name 1 PRO2   mild pain complaints  -JW      Time 1 3'  -JW         Exercise 2    Exercise Name 2 Pulleys  -      Time 2 2'  -JW         Exercise 3    Exercise Name 3 Manual shld  -         Exercise 4  "   Exercise Name 4 Supine shld iso @ 90°: flex, ext, horz ad/ab  -JW      Reps 4 10x each  -JW         Exercise 5    Exercise Name 5 Wand flex, abd, ext  -JW      Sets 5 2  -JW      Reps 5 10  -JW         Exercise 6    Exercise Name 6 Doorway pec S  -JW      Sets 6 2  -JW      Time 6 30\"  -JW        User Key  (r) = Recorded By, (t) = Taken By, (c) = Cosigned By    Initials Name Provider Type    Obed Ledezma PTA Physical Therapy Assistant                        Manual Rx (last 36 hours)      Manual Treatments     Row Name 09/12/18 1500             Manual Rx 1    Manual Rx 1 Location L Shld  -JW      Manual Rx 1 Type PROM/stretching  -JW      Manual Rx 1 Duration 7'  -JW        User Key  (r) = Recorded By, (t) = Taken By, (c) = Cosigned By    Initials Name Provider Type    Obed Ledezma PTA Physical Therapy Assistant                PT OP Goals     Row Name 09/12/18 1700 09/12/18 1500       PT Short Term Goals    STG Date to Achieve  -- 10/01/18  -JW    STG 1  -- patient will be independent with HEP  -JW    STG 1 Progress  -- Ongoing  -JW    STG 2  -- patient will presents with 0/10 pain at rest or with movement  -JW    STG 2 Progress  -- Ongoing  -JW    STG 3  -- patient will present with AROM of left shoulder WNL  -JW    STG 3 Progress  -- Ongoing  -JW       Time Calculation    PT Goal Re-Cert Due Date 10/01/18  -JW  --      User Key  (r) = Recorded By, (t) = Taken By, (c) = Cosigned By    Initials Name Provider Type    Obed Ledezma PTA Physical Therapy Assistant                         Time Calculation:   Start Time: 1520  Stop Time: 1610  Time Calculation (min): 50 min  Total Timed Code Minutes- PT: 50 minute(s)  Therapy Suggested Charges     Code   Minutes Charges    74691 (CPT®) Hc Pt Neuromusc Re Education Ea 15 Min      29455 (CPT®) Hc Pt Ther Proc Ea 15 Min      16148 (CPT®) Hc Gait Training Ea 15 Min      89125 (CPT®) Hc Pt Therapeutic Act Ea 15 Min      47878 (CPT®) Hc Pt Manual " Therapy Ea 15 Min      30512 (CPT®) Hc Pt Ther Massage- Per 15 Min      86539 (CPT®) Hc Pt Iontophoresis Ea 15 Min      44527 (CPT®) Hc Pt Elec Stim Ea-Per 15 Min 15 1    22862 (CPT®) Hc Pt Ultrasound Ea 15 Min      87268 (CPT®) Hc Pt Self Care/Mgmt/Train Ea 15 Min      99046 (CPT®) Hc Pt Prosthetic (S) Train Initial Encounter, Each 15 Min      79794 (CPT®) Hc Orthotic(S) Mgmt/Train Initial Encounter, Each 15min      38432 (CPT®) Hc Pt Aquatic Therapy Ea 15 Min      96232 (CPT®) Hc Pt Orthotic(S)/Prosthetic(S) Encounter, Each 15 Min      Total  15 1        Therapy Charges for Today     Code Description Service Date Service Provider Modifiers Qty    33982374748 HC PT THER PROC EA 15 MIN 9/12/2018 Obed Rdz, PTA GP 2    15484408881 HC PT ELECTRICAL STIM UNATTENDED 9/12/2018 Obed Rdz, PTA  1                    Obed Rdz, PTA  9/12/2018

## 2018-09-18 ENCOUNTER — HOSPITAL ENCOUNTER (OUTPATIENT)
Dept: PHYSICAL THERAPY | Facility: HOSPITAL | Age: 68
Setting detail: THERAPIES SERIES
Discharge: HOME | End: 2018-09-18

## 2018-09-18 DIAGNOSIS — M25.512 ACUTE PAIN OF LEFT SHOULDER: Primary | ICD-10-CM

## 2018-09-18 PROCEDURE — 97110 THERAPEUTIC EXERCISES: CPT

## 2018-09-18 PROCEDURE — G0283 ELEC STIM OTHER THAN WOUND: HCPCS

## 2018-09-18 NOTE — THERAPY TREATMENT NOTE
Outpatient Physical Therapy Ortho Treatment Note  [facility]     Patient Name: Kathleen Joseph  : 1950  MRN: [id number]  Today's Date: 2018      Visit Date: 2018     Subjective Improvement 0  Visits 3/3  Visits approved 12 2x week for 6 weeks  RTMD this Thursday.  MD note faxed to MD  Recert Date 10-    Left shoulder pain    Visit Dx:    ICD-10-CM ICD-9-CM   1. Acute pain of left shoulder M25.512 719.41       There is no problem list on file for this patient.       Past Medical History:   Diagnosis Date   • Hypertension         Past Surgical History:   Procedure Laterality Date   • BACK SURGERY     • BREAST SURGERY      2 benign breast lumps removed   • COLONOSCOPY N/A 3/26/2018    Procedure: COLONOSCOPY;  Surgeon: Humza Davis DO;  Location: [facility] ENDOSCOPY;  Service: Gastroenterology   • ENDOSCOPY N/A 3/26/2018    Procedure: ESOPHAGOGASTRODUODENOSCOPY;  Surgeon: Humza Davis DO;  Location: [facility] ENDOSCOPY;  Service: Gastroenterology   • TONSILLECTOMY     • TUBAL ABDOMINAL LIGATION               PT Ortho     Row Name 18 1500       General ROM    LT Upper Ext Lt Shoulder ABduction;Lt Shoulder Flexion;Lt Shoulder External Rotation;Lt Shoulder Internal Rotation  -CP       Left Upper Ext    Lt Shoulder Abduction AROM standing 90  -CP    Lt Shoulder Abduction PROM 150  -CP    Lt Shoulder Flexion AROM 105  -CP    Lt Shoulder Flexion PROM 145  -CP    Lt Shoulder External Rotation AROM 75  -CP    Lt Shoulder External Rotation PROM 90  -CP    Lt Shoulder Internal Rotation AROM beltline  -CP    Lt Shoulder Internal Rotation PROM 60  -CP       MMT (Manual Muscle Testing)    Lt Upper Ext Lt Shoulder Flexion;Lt Shoulder ABduction;Lt Shoulder Internal Rotation;Lt Shoulder External Rotation  -CP      User Key  (r) = Recorded By, (t) = Taken By, (c) = Cosigned By    Initials Name Provider Type    Latoya Cordero, PTA Physical Therapy Assistant                             PT Assessment/Plan     Row Name 09/18/18 1651          PT Assessment    Assessment Comments TTP to post shoulder area.  MD note faxed   -CP        PT Plan    PT Frequency 2x/week  -CP     Predicted Duration of Therapy Intervention (Therapy Eval) 4 weeks  -CP     PT Plan Comments Cont with POC.  Progressing as tolerated  -CP       User Key  (r) = Recorded By, (t) = Taken By, (c) = Cosigned By    Initials Name Provider Type    CP Latoya Camargo, PTA Physical Therapy Assistant                Modalities     Row Name 09/18/18 1500             Ice    Ice Applied Yes  -CP      Location left shoulder  -CP      Rx Minutes --   20 minutes with iFC  -CP      Ice S/P Rx Yes  -CP         ELECTRICAL STIMULATION    Attended/Unattended Unattended  -CP      Stimulation Type IFC  -CP      Location/Electrode Placement/Other left shoulder  -CP      43512 - PT Electrical Stimulation (Manual) Minutes 20  -CP        User Key  (r) = Recorded By, (t) = Taken By, (c) = Cosigned By    Initials Name Provider Type    Latoya Cordero, KRISTINA Physical Therapy Assistant                Exercises     Row Name 09/18/18 1500             Subjective Comments    Subjective Comments most pain is post shoulder. not lifting anything heavy  -CP         Subjective Pain    Able to rate subjective pain? yes  -CP      Pre-Treatment Pain Level 4  -CP         Exercise 1    Exercise Name 1 pulleys  -CP      Reps 1 30  -CP      Time 1 fl and Scap  -CP         Exercise 2    Exercise Name 2 Pro II level 1  -CP      Time 2 10  -CP      Additional Comments FW/BW  -CP         Exercise 3    Exercise Name 3 supine hands clasp AAROM FL  -CP      Sets 3 2  -CP      Reps 3 10  -CP         Exercise 4    Exercise Name 4 AAROM AB with cane  -CP      Reps 4 10  -CP        User Key  (r) = Recorded By, (t) = Taken By, (c) = Cosigned By    Initials Name Provider Type    Latoya Cordero, KRISTINA Physical Therapy Assistant                               PT OP Goals     Row Name  09/18/18 1500          PT Short Term Goals    STG Date to Achieve 10/01/18  -CP     STG 1 patient will be independent with HEP  -CP     STG 1 Progress Ongoing  -CP     STG 2 patient will presents with 0/10 pain at rest or with movement  -CP     STG 2 Progress Ongoing  -CP     STG 3 patient will present with AROM of left shoulder WNL  -CP     STG 3 Progress Ongoing  -CP        Time Calculation    PT Goal Re-Cert Due Date 10/01/18  -CP       User Key  (r) = Recorded By, (t) = Taken By, (c) = Cosigned By    Initials Name Provider Type    CP Latoya Camargo, PTA Physical Therapy Assistant                         Time Calculation:   Start Time: 1520  Stop Time: 1625  Time Calculation (min): 65 min  Total Timed Code Minutes- PT: 40 minute(s)  Therapy Suggested Charges     Code   Minutes Charges    26975 (CPT®) Hc Pt Neuromusc Re Education Ea 15 Min      87173 (CPT®) Hc Pt Ther Proc Ea 15 Min      40810 (CPT®) Hc Gait Training Ea 15 Min      52802 (CPT®) Hc Pt Therapeutic Act Ea 15 Min      34006 (CPT®) Hc Pt Manual Therapy Ea 15 Min      50058 (CPT®) Hc Pt Ther Massage- Per 15 Min      09219 (CPT®) Hc Pt Iontophoresis Ea 15 Min      88643 (CPT®) Hc Pt Elec Stim Ea-Per 15 Min 20 1    95229 (CPT®) Hc Pt Ultrasound Ea 15 Min      16164 (CPT®) Hc Pt Self Care/Mgmt/Train Ea 15 Min      26899 (CPT®) Hc Pt Prosthetic (S) Train Initial Encounter, Each 15 Min      67802 (CPT®) Hc Orthotic(S) Mgmt/Train Initial Encounter, Each 15min      35752 (CPT®) Hc Pt Aquatic Therapy Ea 15 Min      82332 (CPT®) Hc Pt Orthotic(S)/Prosthetic(S) Encounter, Each 15 Min      Total  20 1        Therapy Charges for Today     Code Description Service Date Service Provider Modifiers Qty    82106791711 HC PT THER PROC EA 15 MIN 9/18/2018 Latoya Camargo, KRISTINA GP 3    06372416848 HC PT ELECTRICAL STIM UNATTENDED 9/18/2018 Latoya Camargo, PTA  1    29443076457 HC PT THER SUPP EA 15 MIN 9/18/2018 Latoya Camargo, PTA GP 1                    Latoya  ASPEN Camargo, PTA  9/18/2018

## 2018-09-20 ENCOUNTER — HOSPITAL ENCOUNTER (OUTPATIENT)
Dept: PHYSICAL THERAPY | Facility: HOSPITAL | Age: 68
Setting detail: THERAPIES SERIES
Discharge: HOME | End: 2018-09-20

## 2018-09-20 DIAGNOSIS — M25.512 ACUTE PAIN OF LEFT SHOULDER: Primary | ICD-10-CM

## 2018-09-20 PROCEDURE — G0283 ELEC STIM OTHER THAN WOUND: HCPCS

## 2018-09-20 PROCEDURE — 97110 THERAPEUTIC EXERCISES: CPT

## 2018-09-20 NOTE — THERAPY TREATMENT NOTE
Outpatient Physical Therapy Ortho Treatment Note  [facility]     Patient Name: Kathleen Joseph  : 1950  MRN: [id number]  Today's Date: 2018      Visit Date: 2018     Subjective Improvement shaun cherry maybe  Visits 4/4  Visits approved 12 2x week x 6 weeks  RTMD ??  Recert Date 10-    Left shoulder Pain    Visit Dx:    ICD-10-CM ICD-9-CM   1. Acute pain of left shoulder M25.512 719.41       There is no problem list on file for this patient.       Past Medical History:   Diagnosis Date   • Hypertension         Past Surgical History:   Procedure Laterality Date   • BACK SURGERY     • BREAST SURGERY      2 benign breast lumps removed   • COLONOSCOPY N/A 3/26/2018    Procedure: COLONOSCOPY;  Surgeon: Humza Davis DO;  Location: [facility] ENDOSCOPY;  Service: Gastroenterology   • ENDOSCOPY N/A 3/26/2018    Procedure: ESOPHAGOGASTRODUODENOSCOPY;  Surgeon: Humza Davis DO;  Location: [facility] ENDOSCOPY;  Service: Gastroenterology   • TONSILLECTOMY     • TUBAL ABDOMINAL LIGATION               PT Ortho     Row Name 18 1500       General ROM    LT Upper Ext Lt Shoulder ABduction;Lt Shoulder Flexion;Lt Shoulder External Rotation;Lt Shoulder Internal Rotation  -CP       Left Upper Ext    Lt Shoulder Abduction AROM standing 90  -CP    Lt Shoulder Abduction PROM 150  -CP    Lt Shoulder Flexion AROM 105  -CP    Lt Shoulder Flexion PROM 145  -CP    Lt Shoulder External Rotation AROM 75  -CP    Lt Shoulder External Rotation PROM 90  -CP    Lt Shoulder Internal Rotation AROM beltline  -CP    Lt Shoulder Internal Rotation PROM 60  -CP       MMT (Manual Muscle Testing)    Lt Upper Ext Lt Shoulder Flexion;Lt Shoulder ABduction;Lt Shoulder Internal Rotation;Lt Shoulder External Rotation  -CP      User Key  (r) = Recorded By, (t) = Taken By, (c) = Cosigned By    Initials Name Provider Type    Latoya Cordero, PTA Physical Therapy Assistant                            PT Assessment/Plan      Row Name 09/20/18 1611          PT Assessment    Assessment Comments Patient had increase tolerance to PROM.  Appears to be increasing in ROM  -CP        PT Plan    PT Frequency 2x/week  -CP     Predicted Duration of Therapy Intervention (Therapy Eval) 4 weeks  -CP     PT Plan Comments Cont with POC.  Table wipes  -CP       User Key  (r) = Recorded By, (t) = Taken By, (c) = Cosigned By    Initials Name Provider Type    Latoya Cordero, KRISTINA Physical Therapy Assistant                Modalities     Row Name 09/20/18 1600             Ice    Ice Applied Yes  -CP      Location left shoulder   -CP      Rx Minutes --   20 minutes with ifc  -CP      Ice S/P Rx Yes  -CP         ELECTRICAL STIMULATION    Attended/Unattended Unattended  -CP      Stimulation Type IFC  -CP      Location/Electrode Placement/Other left shoulder 20  -CP        User Key  (r) = Recorded By, (t) = Taken By, (c) = Cosigned By    Initials Name Provider Type    Latoya Cordero, PTA Physical Therapy Assistant                Exercises     Row Name 09/20/18 1600             Subjective Comments    Subjective Comments MD would like for her to cont  for at least 4 more visit.  She is progressing slowly.  She stated that he thought she had a torn mm  -CP         Subjective Pain    Able to rate subjective pain? yes  -CP      Pre-Treatment Pain Level 4  -CP      Post-Treatment Pain Level 2  -CP         Exercise 1    Exercise Name 1 Pro II level 1  -CP      Time 1 10  -CP      Additional Comments FW/BW  -CP         Exercise 2    Exercise Name 2 pulleys  -CP      Reps 2 30  -CP         Exercise 3    Exercise Name 3 standin gLf and scap stretch on tball  -CP      Sets 3 2  -CP      Reps 3 10  -CP         Exercise 4    Exercise Name 4 seated wedge wipes  -CP      Sets 4 2  -CP      Reps 4 10  -CP         Exercise 5    Exercise Name 5 seated scap squeezes  -CP      Sets 5 2  -CP      Reps 5 10  -CP         Exercise 6    Exercise Name 6 supine cane fl with  chest press  -CP      Sets 6 2  -CP      Reps 6 10  -CP         Exercise 7    Exercise Name 7 supine Horz AB/AD  -CP      Sets 7 2  -CP      Reps 7 10  -CP         Exercise 8    Exercise Name 8 supine serrutas punches  -CP      Sets 8 2  -CP      Reps 8 10  -CP         Exercise 9    Exercise Name 9 PROM  -CP      Additional Comments 9  -CP        User Key  (r) = Recorded By, (t) = Taken By, (c) = Cosigned By    Initials Name Provider Type    CP Latoya Camargo, KRISTINA Physical Therapy Assistant                               PT OP Goals     Row Name 09/20/18 1600          PT Short Term Goals    STG Date to Achieve 10/01/18  -CP     STG 1 patient will be independent with HEP  -CP     STG 1 Progress Ongoing  -CP     STG 2 patient will presents with 0/10 pain at rest or with movement  -CP     STG 2 Progress Ongoing  -CP     STG 3 patient will present with AROM of left shoulder WNL  -CP     STG 3 Progress Ongoing  -CP        Time Calculation    PT Goal Re-Cert Due Date 10/01/18  -CP       User Key  (r) = Recorded By, (t) = Taken By, (c) = Cosigned By    Initials Name Provider Type    CP Latoya Camargo, KRISTINA Physical Therapy Assistant          Therapy Education  Education Details: pulleys, scap squeezes,   Given: HEP  Program: New  How Provided: Verbal, Demonstration  Provided to: Patient  Level of Understanding: Verbalized, Demonstrated              Time Calculation:   Start Time: 1515  Stop Time: 1627  Time Calculation (min): 72 min  Total Timed Code Minutes- PT: 45 minute(s)  Therapy Suggested Charges     Code   Minutes Charges    None           Therapy Charges for Today     Code Description Service Date Service Provider Modifiers Qty    44672200051 HC PT THER SUPP EA 15 MIN 9/20/2018 Latoya Camargo, PTA GP 1    29735578710 HC PT ELECTRICAL STIM UNATTENDED 9/20/2018 Latoya Camargo, PTA  1    66332532452 HC PT THER PROC EA 15 MIN 9/20/2018 Latoya Camargo, PTA GP 3                    Latoya Camargo  PTA  9/20/2018

## 2018-09-25 ENCOUNTER — APPOINTMENT (OUTPATIENT)
Dept: PHYSICAL THERAPY | Facility: HOSPITAL | Age: 68
End: 2018-09-25

## 2018-09-25 ENCOUNTER — HOSPITAL ENCOUNTER (OUTPATIENT)
Dept: PHYSICAL THERAPY | Facility: HOSPITAL | Age: 68
Setting detail: THERAPIES SERIES
Discharge: HOME | End: 2018-09-25

## 2018-09-25 DIAGNOSIS — M25.512 ACUTE PAIN OF LEFT SHOULDER: Primary | ICD-10-CM

## 2018-09-25 PROCEDURE — G0283 ELEC STIM OTHER THAN WOUND: HCPCS

## 2018-09-25 PROCEDURE — 97110 THERAPEUTIC EXERCISES: CPT

## 2018-09-25 NOTE — THERAPY TREATMENT NOTE
Outpatient Physical Therapy Ortho Treatment Note  [facility]     Patient Name: Kathleen Joseph  : 1950  MRN: [id number]  Today's Date: 2018      Visit Date: 2018     Subjective Improvement a little  Visits 5/5  Visits  approved 12 2x wk x 6 wks  RTMD this Thursday MD note faxed  Recert Date 10-    Left shoulder pain    Visit Dx:    ICD-10-CM ICD-9-CM   1. Acute pain of left shoulder M25.512 719.41       There is no problem list on file for this patient.       Past Medical History:   Diagnosis Date   • Hypertension         Past Surgical History:   Procedure Laterality Date   • BACK SURGERY     • BREAST SURGERY      2 benign breast lumps removed   • COLONOSCOPY N/A 3/26/2018    Procedure: COLONOSCOPY;  Surgeon: Humza Davis DO;  Location: [facility] ENDOSCOPY;  Service: Gastroenterology   • ENDOSCOPY N/A 3/26/2018    Procedure: ESOPHAGOGASTRODUODENOSCOPY;  Surgeon: Humza Davis DO;  Location: [facility] ENDOSCOPY;  Service: Gastroenterology   • TONSILLECTOMY     • TUBAL ABDOMINAL LIGATION               PT Ortho     Row Name 18 1100       Left Upper Ext    Lt Shoulder Abduction AROM standing 110 with sub  -CP    Lt Shoulder Abduction PROM WNL pain at end range  -CP    Lt Shoulder Flexion AROM 105  -CP    Lt Shoulder Flexion PROM WNL pain at end range  -CP    Lt Shoulder External Rotation AROM 75 at side  -CP    Lt Shoulder External Rotation PROM 90  -CP    Lt Shoulder Internal Rotation AROM beltline pain  -CP    Lt Shoulder Internal Rotation PROM WNL  -CP       MMT (Manual Muscle Testing)    General MMT Comments not tested secondary to pain  -CP      User Key  (r) = Recorded By, (t) = Taken By, (c) = Cosigned By    Initials Name Provider Type    Latoya Cordero, PTA Physical Therapy Assistant                            PT Assessment/Plan     Row Name 18 1153          PT Assessment    Assessment Comments Pat ient has made little progress.  Painful at end range  with PROM,  Pain with AROM.  TTP to post shoulder area.  MD notes faxed to MD today  -CP        PT Plan    PT Frequency 2x/week  -CP     Predicted Duration of Therapy Intervention (Therapy Eval) 6 weeks  -CP     PT Plan Comments Cont with POC.  -CP       User Key  (r) = Recorded By, (t) = Taken By, (c) = Cosigned By    Initials Name Provider Type    CP Latoya Camargo, PTA Physical Therapy Assistant                Modalities     Row Name 09/25/18 1100             Ice    Ice Applied Yes  -CP      Location left shoulder  -CP      Rx Minutes 15 mins  -CP      Ice S/P Rx Yes  -CP         ELECTRICAL STIMULATION    Attended/Unattended Unattended  -CP      Stimulation Type IFC  -CP      Location/Electrode Placement/Other left shoulder  -CP      47152 - PT Electrical Stimulation (Manual) Minutes 15  -CP        User Key  (r) = Recorded By, (t) = Taken By, (c) = Cosigned By    Initials Name Provider Type    CP Latoya Camargo, PTA Physical Therapy Assistant                Exercises     Row Name 09/25/18 1100             Subjective Comments    Subjective Comments Reports a little more pain today  -CP         Subjective Pain    Able to rate subjective pain? yes  -CP      Pre-Treatment Pain Level 4  -CP      Post-Treatment Pain Level 0  -CP      Subjective Pain Comment Post left arm and shoulder  and side.  Numb from IFC and ice after therapy  -CP         Exercise 1    Exercise Name 1 pulleys  -CP      Reps 1 30  -CP         Exercise 2    Exercise Name 2 standing ext with tband  -CP      Sets 2 2  -CP      Reps 2 10  -CP      Time 2 yellow  -CP         Exercise 3    Exercise Name 3 scap row with rband  -CP      Sets 3 2  -CP      Reps 3 10  -CP      Time 3 yellow  -CP         Exercise 4    Exercise Name 4 table wipes  -CP      Sets 4 3  -CP      Reps 4 10  -CP      Time 4 1 lb  -CP         Exercise 5    Exercise Name 5 Pro II level 2  -CP      Time 5 6  -CP      Additional Comments FW/BW  -CP         Exercise 6    Exercise  Name 6 supine AAROM fl with cane  -CP      Sets 6 3  -CP      Reps 6 10  -CP         Exercise 7    Exercise Name 7 Supine Horz AB/AD with cane  -CP      Sets 7 3  -CP      Reps 7 10  -CP         Exercise 8    Exercise Name 8 PROM  -CP      Time 8 6  -CP         Exercise 9    Exercise Name 9 MD note and measurements  -CP        User Key  (r) = Recorded By, (t) = Taken By, (c) = Cosigned By    Initials Name Provider Type    CP Latoya Camargo, PTA Physical Therapy Assistant                               PT OP Goals     Row Name 09/25/18 1100          PT Short Term Goals    STG Date to Achieve 10/01/18  -CP     STG 1 patient will be independent with HEP  -CP     STG 1 Progress Ongoing  -CP     STG 2 patient will presents with 0/10 pain at rest or with movement  -CP     STG 2 Progress Ongoing  -CP     STG 3 patient will present with AROM of left shoulder WNL  -CP     STG 3 Progress Ongoing  -CP        Time Calculation    PT Goal Re-Cert Due Date 10/01/18  -CP       User Key  (r) = Recorded By, (t) = Taken By, (c) = Cosigned By    Initials Name Provider Type    CP Latoya Camargo, PTA Physical Therapy Assistant          Therapy Education  Given: HEP  Program: Reinforced  How Provided: Verbal  Provided to: Patient  Level of Understanding: Verbalized              Time Calculation:   Start Time: 1100  Stop Time: 1200  Time Calculation (min): 60 min  Total Timed Code Minutes- PT: 40 minute(s)  Therapy Suggested Charges     Code   Minutes Charges    96204 (CPT®) Hc Pt Neuromusc Re Education Ea 15 Min      89377 (CPT®) Hc Pt Ther Proc Ea 15 Min      10339 (CPT®) Hc Gait Training Ea 15 Min      16121 (CPT®) Hc Pt Therapeutic Act Ea 15 Min      32565 (CPT®) Hc Pt Manual Therapy Ea 15 Min      27973 (CPT®) Hc Pt Ther Massage- Per 15 Min      91138 (CPT®) Hc Pt Iontophoresis Ea 15 Min      27393 (CPT®) Hc Pt Elec Stim Ea-Per 15 Min 15 1    35819 (CPT®) Hc Pt Ultrasound Ea 15 Min      43748 (CPT®) Hc Pt Self Care/Mgmt/Train Ea  15 Min      49290 (CPT®) Hc Pt Prosthetic (S) Train Initial Encounter, Each 15 Min      74235 (CPT®) Hc Orthotic(S) Mgmt/Train Initial Encounter, Each 15min      28108 (CPT®) Hc Pt Aquatic Therapy Ea 15 Min      17829 (CPT®) Hc Pt Orthotic(S)/Prosthetic(S) Encounter, Each 15 Min      Total  15 1        Therapy Charges for Today     Code Description Service Date Service Provider Modifiers Qty    21501084547 HC PT ELECTRICAL STIM UNATTENDED 9/25/2018 Latoya Camargo, PTA  1    40802971715 HC PT THER PROC EA 15 MIN 9/25/2018 Latoya Camargo, PTA GP 3                    Latoya Camargo PTA  9/25/2018

## 2018-09-27 ENCOUNTER — HOSPITAL ENCOUNTER (OUTPATIENT)
Dept: PHYSICAL THERAPY | Facility: HOSPITAL | Age: 68
Setting detail: THERAPIES SERIES
Discharge: HOME | End: 2018-09-27

## 2018-09-27 DIAGNOSIS — M25.512 ACUTE PAIN OF LEFT SHOULDER: Primary | ICD-10-CM

## 2018-09-27 PROCEDURE — G0283 ELEC STIM OTHER THAN WOUND: HCPCS

## 2018-09-27 PROCEDURE — 97110 THERAPEUTIC EXERCISES: CPT

## 2018-09-27 NOTE — THERAPY TREATMENT NOTE
Outpatient Physical Therapy Ortho Treatment Note  [facility]     Patient Name: Kathleen Joseph  : 1950  MRN: [id number]  Today's Date: 2018      Visit Date: 2018     Subjective Improvement 65%  Visits 6/7  Visits approved 12 2xwk x 6 wks  RTMD next Thursday  Recert Date 10-    Left Shoulder Pain    Visit Dx:    ICD-10-CM ICD-9-CM   1. Acute pain of left shoulder M25.512 719.41       There is no problem list on file for this patient.       Past Medical History:   Diagnosis Date   • Hypertension         Past Surgical History:   Procedure Laterality Date   • BACK SURGERY     • BREAST SURGERY      2 benign breast lumps removed   • COLONOSCOPY N/A 3/26/2018    Procedure: COLONOSCOPY;  Surgeon: Humza Davis DO;  Location: [facility] ENDOSCOPY;  Service: Gastroenterology   • ENDOSCOPY N/A 3/26/2018    Procedure: ESOPHAGOGASTRODUODENOSCOPY;  Surgeon: Humza Davis DO;  Location: [facility] ENDOSCOPY;  Service: Gastroenterology   • TONSILLECTOMY     • TUBAL ABDOMINAL LIGATION               PT Ortho     Row Name 18 1100       Left Upper Ext    Lt Shoulder Abduction AROM standing 110 with sub  -CP    Lt Shoulder Abduction PROM WNL pain at end range  -CP    Lt Shoulder Flexion AROM 105  -CP    Lt Shoulder Flexion PROM WNL pain at end range  -CP    Lt Shoulder External Rotation AROM 75 at side  -CP    Lt Shoulder External Rotation PROM 90  -CP    Lt Shoulder Internal Rotation AROM beltline pain  -CP    Lt Shoulder Internal Rotation PROM WNL  -CP       MMT (Manual Muscle Testing)    General MMT Comments not tested secondary to pain  -CP      User Key  (r) = Recorded By, (t) = Taken By, (c) = Cosigned By    Initials Name Provider Type    Latoya Cordero, PTA Physical Therapy Assistant                            PT Assessment/Plan     Row Name 18 1443          PT Assessment    Assessment Comments Pain at end range with fl and AB P ROM  -CP        PT Plan    PT Frequency  2x/week  -CP     Predicted Duration of Therapy Intervention (Therapy Eval) 6 weeks per md order  -CP     PT Plan Comments Cont with POC. Lat pull down with tband  -CP       User Key  (r) = Recorded By, (t) = Taken By, (c) = Cosigned By    Initials Name Provider Type    CP Latoya Camargo, PTA Physical Therapy Assistant                Modalities     Row Name 09/27/18 1300             Ice    Ice Applied Yes  -CP      Location left shoulder  -CP      Rx Minutes --   20 minutes with IFC  -CP      Ice S/P Rx Yes  -CP         ELECTRICAL STIMULATION    Attended/Unattended Unattended  -CP      Stimulation Type IFC  -CP      Location/Electrode Placement/Other left  -CP      75470 - PT Electrical Stimulation (Manual) Minutes 20  -CP        User Key  (r) = Recorded By, (t) = Taken By, (c) = Cosigned By    Initials Name Provider Type    CP Latoya Camargo, PTA Physical Therapy Assistant                Exercises     Row Name 09/27/18 1300             Subjective Comments    Subjective Comments Patient states that she is 50% from being normal and 65% b marsha since her fall.  -CP         Subjective Pain    Able to rate subjective pain? yes  -CP      Pre-Treatment Pain Level 3  -CP      Post-Treatment Pain Level 0  -CP         Exercise 1    Exercise Name 1 Pro II level 1  -CP      Time 1 10  -CP      Additional Comments FW/BW  -CP         Exercise 2    Exercise Name 2 Pulleys  -CP      Reps 2 30   -CP         Exercise 3    Exercise Name 3 standing shoulder ext with tband  -CP      Sets 3 2  -CP      Reps 3 10  -CP      Time 3 red  -CP         Exercise 4    Exercise Name 4 scap row with tband  -CP      Sets 4 2  -CP      Reps 4 10  -CP      Time 4 red  -CP      Additional Comments .  -CP         Exercise 5    Exercise Name 5 scap row high with tband  -CP      Sets 5 2  -CP      Reps 5 10  -CP      Time 5 red  -CP         Exercise 6    Exercise Name 6 table wipes on  split table  -CP      Sets 6 2  -CP      Reps 6 10  -CP       Time 6 1lb  -CP         Exercise 7    Exercise Name 7 wall wipes with stretch  -CP      Sets 7 2  -CP      Reps 7 10  -CP         Exercise 8    Exercise Name 8 supine fl   -CP      Sets 8 2  -CP      Reps 8 10  -CP      Time 8 2 lb cane  -CP         Exercise 9    Exercise Name 9 SL'ing AB to 90  -CP      Sets 9 2  -CP      Reps 9 10  -CP         Exercise 10    Exercise Name 10 PROM  -CP      Time 10 6  -CP      Additional Comments pain at end range for fl and Ab  -CP        User Key  (r) = Recorded By, (t) = Taken By, (c) = Cosigned By    Initials Name Provider Type    CP Latoya Camargo, PTA Physical Therapy Assistant                               PT OP Goals     Row Name 09/27/18 1400          PT Short Term Goals    STG Date to Achieve 10/01/18  -CP     STG 1 patient will be independent with HEP  -CP     STG 1 Progress Ongoing  -CP     STG 2 patient will presents with 0/10 pain at rest or with movement  -CP     STG 2 Progress Ongoing  -CP     STG 3 patient will present with AROM of left shoulder WNL  -CP     STG 3 Progress Ongoing  -CP        Time Calculation    PT Goal Re-Cert Due Date 10/01/18  -CP       User Key  (r) = Recorded By, (t) = Taken By, (c) = Cosigned By    Initials Name Provider Type    CP Latoya Camargo, PTA Physical Therapy Assistant          Therapy Education  Education Details: shoulder ext, scap rows mid and high with tband  Given: HEP  Program: New  How Provided: Verbal, Demonstration, Written  Provided to: Patient  Level of Understanding: Verbalized, Demonstrated              Time Calculation:   Start Time: 1345  Stop Time: 1450  Time Calculation (min): 65 min  Total Timed Code Minutes- PT: 45 minute(s)  Therapy Suggested Charges     Code   Minutes Charges    96253 (CPT®) Hc Pt Neuromusc Re Education Ea 15 Min      93771 (CPT®) Hc Pt Ther Proc Ea 15 Min      16813 (CPT®) Hc Gait Training Ea 15 Min      06669 (CPT®) Hc Pt Therapeutic Act Ea 15 Min      40742 (CPT®) Hc Pt Manual Therapy Ea  15 Min      21724 (CPT®) Hc Pt Ther Massage- Per 15 Min      69068 (CPT®) Hc Pt Iontophoresis Ea 15 Min      59267 (CPT®) Hc Pt Elec Stim Ea-Per 15 Min 20 1    63388 (CPT®) Hc Pt Ultrasound Ea 15 Min      37802 (CPT®) Hc Pt Self Care/Mgmt/Train Ea 15 Min      28140 (CPT®) Hc Pt Prosthetic (S) Train Initial Encounter, Each 15 Min      29166 (CPT®) Hc Orthotic(S) Mgmt/Train Initial Encounter, Each 15min      64079 (CPT®) Hc Pt Aquatic Therapy Ea 15 Min      86441 (CPT®) Hc Pt Orthotic(S)/Prosthetic(S) Encounter, Each 15 Min      Total  20 1        Therapy Charges for Today     Code Description Service Date Service Provider Modifiers Qty    89099905538 HC PT THER SUPP EA 15 MIN 9/27/2018 Latoya Camargo, PTA GP 1    44595940112 HC PT ELECTRICAL STIM UNATTENDED 9/27/2018 Latoya Camargo, PTA  1    83156075720 HC PT THER PROC EA 15 MIN 9/27/2018 Latoya Camargo, PTA GP 3                    Latoya Camargo, PTA  9/27/2018

## 2018-10-02 ENCOUNTER — HOSPITAL ENCOUNTER (OUTPATIENT)
Dept: PHYSICAL THERAPY | Facility: HOSPITAL | Age: 68
Setting detail: THERAPIES SERIES
Discharge: HOME | End: 2018-10-02

## 2018-10-02 DIAGNOSIS — M25.512 ACUTE PAIN OF LEFT SHOULDER: Primary | ICD-10-CM

## 2018-10-02 PROCEDURE — 97110 THERAPEUTIC EXERCISES: CPT

## 2018-10-02 NOTE — THERAPY PROGRESS REPORT/RE-CERT
Outpatient Physical Therapy Ortho Progress Note  [facility]     Patient Name: Kathleen Joseph  : 1950  MRN: [id number]  Today's Date: 10/2/2018      Visit Date: 10/02/2018    There is no problem list on file for this patient.       Past Medical History:   Diagnosis Date   • Hypertension         Past Surgical History:   Procedure Laterality Date   • BACK SURGERY     • BREAST SURGERY      2 benign breast lumps removed   • COLONOSCOPY N/A 3/26/2018    Procedure: COLONOSCOPY;  Surgeon: Humza Davis DO;  Location: [facility] ENDOSCOPY;  Service: Gastroenterology   • ENDOSCOPY N/A 3/26/2018    Procedure: ESOPHAGOGASTRODUODENOSCOPY;  Surgeon: Humza Davis DO;  Location: [facility] ENDOSCOPY;  Service: Gastroenterology   • TONSILLECTOMY     • TUBAL ABDOMINAL LIGATION         Visit Dx:     ICD-10-CM ICD-9-CM   1. Acute pain of left shoulder M25.512 719.41                 PT Ortho     Row Name 10/02/18 1500       Subjective Pain    Able to rate subjective pain? yes  -MS    Pre-Treatment Pain Level 2  -MS    Post-Treatment Pain Level 2  -MS    Subjective Pain Comment posterior left upper arm and shoulder  -MS       Left Upper Ext    Lt Shoulder Abduction AROM 114 degrees  -MS    Lt Shoulder Flexion AROM 109 degrees  -MS    Lt Shoulder External Rotation AROM 76 degrees  -MS    Lt Shoulder Internal Rotation AROM beltline (painful)  -MS    Lt Shoulder Internal Rotation PROM WNL  -MS       MMT (Manual Muscle Testing)    General MMT Comments Patient left shoulder measureable today: ABD -4/5, Flex 4/5; ER/IR 4+/5  -MS      User Key  (r) = Recorded By, (t) = Taken By, (c) = Cosigned By    Initials Name Provider Type    Juan Higuera, PT Physical Therapist                                  PT OP Goals     Row Name 10/02/18 1500          PT Short Term Goals    STG Date to Achieve 10/23/18  -MS     STG 1 patient will be independent with HEP  -MS     STG 1 Progress Progressing  -MS     STG 2 patient will  presents with 0/10 pain at rest or with movement  -MS     STG 2 Progress Progressing  -MS     STG 3 patient will present with AROM of left shoulder WNL  -MS     STG 3 Progress Progressing  -MS     STG 4 patient will present with 4+/5 strength of her right shoulder in ABD/flex on MMT  -MS     STG 4 Progress New  -MS        Time Calculation    PT Goal Re-Cert Due Date 10/23/18  -MS       User Key  (r) = Recorded By, (t) = Taken By, (c) = Cosigned By    Initials Name Provider Type    MS Guardado Juan, PT Physical Therapist                PT Assessment/Plan     Row Name 10/02/18 1612          PT Assessment    Assessment Comments Patient AROM of her left shoulder is signifiantly improved in all planes since her evaluation. Quick DASH score is almost 20 point lower than taken on evaluation. Patient's left shoulder strength is measureable as patient can tolerate his much more easily, but the left shoulder is still relatively weak compared to the right. Patient continues to tolerate the progression of her therapeutic exercise program well.   -MS        PT Plan    PT Frequency 2x/week  -MS     Predicted Duration of Therapy Intervention (Therapy Eval) 10 weeks   patient wll miss two weeks while she takes vacation  -MS     PT Plan Comments Continue progression of therex and HEP. Increase low and mid level strengthening exercises and also scapular strengthening to promote shoulder stability to support AROM gains.  -MS       User Key  (r) = Recorded By, (t) = Taken By, (c) = Cosigned By    Initials Name Provider Type    MS RoyerJuan perez, PT Physical Therapist                  Exercises     Row Name 10/02/18 1500             Subjective Comments    Subjective Comments Patient states that her shoulder is getting better and she can now sleep on it. Patient still avoiding use of left arm at work. Stretching and reaching are still difficult for her using her left arm. Patient is planning to go to [place] next week for 2  "weeks and plans to continue with her HEP.  -MS         Subjective Pain    Able to rate subjective pain? yes  -MS      Pre-Treatment Pain Level 2  -MS      Post-Treatment Pain Level 2  -MS      Subjective Pain Comment posterior left upper arm and shoulder  -MS         Exercise 1    Exercise Name 1 Pro II level 2  -MS      Time 1 10  -MS      Additional Comments FW/ BW  -MS         Exercise 2    Exercise Name 2 serratus wall slides with RTB  -MS      Reps 2 10  -MS      Time 2 5\" hold  -MS      Additional Comments HEP  -MS         Exercise 3    Exercise Name 3 serratus press  -MS      Sets 3 2  -MS      Reps 3 10  -MS         Exercise 4    Exercise Name 4 standing biodex lat row  -MS      Reps 4 30  -MS      Additional Comments weight 4  -MS         Exercise 5    Exercise Name 5 Pulleys  -MS      Time 5 5 min  -MS        User Key  (r) = Recorded By, (t) = Taken By, (c) = Cosigned By    Initials Name Provider Type    Juan Higuera, PT Physical Therapist                        Outcome Measure Options: Quick DASH  Quick DASH  Open a tight or new jar.: Moderate Difficulty  Do heavy household chores (e.g., wash walls, wash floors): Moderate Difficulty  Carry a shopping bag or briefcase: Mild Difficulty  Wash your back: Moderate Difficulty  Use a knife to cut food: Mild Difficulty  Recreational activities in which you take some force or impact through your arm, should or hand (e.g. golf, hammering, tennis, etc.): Moderate Difficulty  During the past week, to what extent has your arm, shoulder, or hand problem interfered with your normal social activites with family, friends, neighbors or groups?: Slightly  During the past week, were you limited in your work or other regular daily activities as a result of your arm, shoulder or hand problem?: Moderately Limited  Arm, Shoulder, or hand pain: Mild  Tingling (pins and needles) in your arm, shoulder, or hand: Mild  During the past week, how much difficulty have you had " sleeping because of the pain in your arm, shoulder or hand?: Mild Difficulty  Number of Questions Answered: 11  Quick DASH Score: 36.36         Time Calculation:     Therapy Suggested Charges     Code   Minutes Charges    None             Start Time: 1520  Stop Time: 1608  Time Calculation (min): 48 min     Therapy Charges for Today     Code Description Service Date Service Provider Modifiers Qty    78166964125 HC PT THER PROC EA 15 MIN 10/2/2018 Juan Guardado, PT GP 3          PT G-Codes  Outcome Measure Options: Quick DASH  Quick DASH Score: 36.36         Juan Guardado, PT  10/2/2018

## 2018-10-04 ENCOUNTER — HOSPITAL ENCOUNTER (OUTPATIENT)
Dept: PHYSICAL THERAPY | Facility: HOSPITAL | Age: 68
Setting detail: THERAPIES SERIES
Discharge: HOME | End: 2018-10-04

## 2018-10-04 PROCEDURE — G0283 ELEC STIM OTHER THAN WOUND: HCPCS

## 2018-10-04 PROCEDURE — 97110 THERAPEUTIC EXERCISES: CPT

## 2018-10-04 NOTE — THERAPY TREATMENT NOTE
Outpatient Physical Therapy Ortho Treatment Note  [facility] [place] [facility]     Patient Name: Kathleen Joseph  : 1950  MRN: [id number]  Today's Date: 10/4/2018      Visit Date: 10/04/2018     Subjective Improvement 605  Visits 8/9   Visits [id number] visits 2 x week x 6 weeks  RTMD 2 weeks  Recert 10/22/2018    Left shoulder pain      Visit Dx:  No diagnosis found.    There is no problem list on file for this patient.       Past Medical History:   Diagnosis Date   • Hypertension         Past Surgical History:   Procedure Laterality Date   • BACK SURGERY     • BREAST SURGERY      2 benign breast lumps removed   • COLONOSCOPY N/A 3/26/2018    Procedure: COLONOSCOPY;  Surgeon: Humza Davis DO;  Location: [facility] ENDOSCOPY;  Service: Gastroenterology   • ENDOSCOPY N/A 3/26/2018    Procedure: ESOPHAGOGASTRODUODENOSCOPY;  Surgeon: Hmuza Davis DO;  Location: [facility] ENDOSCOPY;  Service: Gastroenterology   • TONSILLECTOMY     • TUBAL ABDOMINAL LIGATION               PT Ortho     Row Name 10/02/18 1500       Subjective Pain    Able to rate subjective pain? yes  -MS    Pre-Treatment Pain Level 2  -MS    Post-Treatment Pain Level 2  -MS    Subjective Pain Comment posterior left upper arm and shoulder  -MS       Left Upper Ext    Lt Shoulder Abduction AROM 114 degrees  -MS    Lt Shoulder Flexion AROM 109 degrees  -MS    Lt Shoulder External Rotation AROM 76 degrees  -MS    Lt Shoulder Internal Rotation AROM beltline (painful)  -MS    Lt Shoulder Internal Rotation PROM WNL  -MS       MMT (Manual Muscle Testing)    General MMT Comments Patient left shoulder measureable today: ABD -4/5, Flex 4/5; ER/IR 4+/5  -MS      User Key  (r) = Recorded By, (t) = Taken By, (c) = Cosigned By    Initials Name Provider Type    Juan Higuera, PT Physical Therapist                            PT Assessment/Plan     Row Name 10/04/18 1641          PT Assessment    Assessment Comments Todays treatment consisted of HEP as  patient will be out of town for 2 weeks.  -CP        PT Plan    PT Frequency 2x/week  -CP     Predicted Duration of Therapy Intervention (Therapy Eval) 6 weeks 2 x week per MD order  -CP     PT Plan Comments Cont with POC.   Take AROM next visit when patient returns from trip  -CP       User Key  (r) = Recorded By, (t) = Taken By, (c) = Cosigned By    Initials Name Provider Type    CP Latoya Camargo, PTA Physical Therapy Assistant                Modalities     Row Name 10/04/18 1600             Ice    Ice Applied Yes  -CP      Location left post shoulder  -CP      Rx Minutes --   20 minutes with IFC  -CP      Ice S/P Rx Yes  -CP         ELECTRICAL STIMULATION    Attended/Unattended Unattended  -CP      Stimulation Type IFC  -CP      Location/Electrode Placement/Other left post shoulder  -CP       PT Electrical Stimulation Unattended (Manual) Minutes 20  -CP        User Key  (r) = Recorded By, (t) = Taken By, (c) = Cosigned By    Initials Name Provider Type    CP Latoya Camargo, PTA Physical Therapy Assistant                Exercises     Row Name 10/04/18 1600             Subjective Comments    Subjective Comments Patient states that MD feels she is slowly progressing and it may take a while to get back together.  -CP         Subjective Pain    Able to rate subjective pain? yes  -CP      Pre-Treatment Pain Level 3  -CP      Post-Treatment Pain Level 3  -CP         Exercise 1    Exercise Name 1 Pulleys  -CP      Reps 1 30  -CP      Time 1 fl and ab  -CP         Exercise 2    Exercise Name 2 standing Post shoulder stretch  -CP      Sets 2 3  -CP      Time 2 30  -CP         Exercise 3    Exercise Name 3 wall push up  -CP      Sets 3 2  -CP      Reps 3 10  -CP         Exercise 4    Exercise Name 4 scap row with tband  -CP      Sets 4 2  -CP      Reps 4 10  -CP      Time 4 red tband  -CP         Exercise 5    Exercise Name 5 shoulder ext with tband  -CP      Sets 5 2  -CP      Reps 5 10  -CP         Exercise 6     Exercise Name 6 Pro II level 2  -CP      Time 6 10  -CP      Additional Comments FW/BW  -CP         Exercise 7    Exercise Name 7 lawn mowver  -CP      Sets 7 2  -CP      Reps 7 10  -CP      Time 7 2 lb  -CP         Exercise 8    Exercise Name 8 PROM  -CP      Time 8 6  -CP         Exercise 9    Exercise Name 9 serratus punches supine  -CP      Sets 9 2  -CP      Reps 9 10  -CP      Time 9 1 lb  -CP        User Key  (r) = Recorded By, (t) = Taken By, (c) = Cosigned By    Initials Name Provider Type    Latoya Cordero, KRISTINA Physical Therapy Assistant                               PT OP Goals     Row Name 10/04/18 1600          PT Short Term Goals    STG Date to Achieve 10/23/18  -CP     STG 1 patient will be independent with HEP  -CP     STG 1 Progress Progressing  -CP     STG 2 patient will presents with 0/10 pain at rest or with movement  -CP     STG 2 Progress Progressing  -CP     STG 3 patient will present with AROM of left shoulder WNL  -CP     STG 3 Progress Progressing  -CP     STG 4 patient will present with 4+/5 strength of her right shoulder in ABD/flex on MMT  -CP     STG 4 Progress New  -CP        Time Calculation    PT Goal Re-Cert Due Date 10/23/18  -CP       User Key  (r) = Recorded By, (t) = Taken By, (c) = Cosigned By    Initials Name Provider Type    Latoya Cordero PTA Physical Therapy Assistant          Therapy Education  Education Details: Standing post cap stretch, wall push ou,   Given: HEP  Program: Reinforced, New  How Provided: Verbal, Demonstration, Written  Provided to: Patient  Level of Understanding: Verbalized, Demonstrated              Time Calculation:   Start Time: 1600  Stop Time: 1700  Time Calculation (min): 60 min  Total Timed Code Minutes- PT: 40 minute(s)  Therapy Suggested Charges     Code   Minutes Charges    92736 (CPT®) Hc Pt Neuromusc Re Education Ea 15 Min      24517 (CPT®) Hc Pt Ther Proc Ea 15 Min      06171 (CPT®) Hc Gait Training Ea 15 Min       83747 (CPT®) Hc Pt Therapeutic Act Ea 15 Min      54009 (CPT®) Hc Pt Manual Therapy Ea 15 Min      08387 (CPT®) Hc Pt Ther Massage- Per 15 Min      46938 (CPT®) Hc Pt Iontophoresis Ea 15 Min      62087 (CPT®) Hc Pt Elec Stim Ea-Per 15 Min      50681 (CPT®) Hc Pt Ultrasound Ea 15 Min      79439 (CPT®) Hc Pt Self Care/Mgmt/Train Ea 15 Min      19394 (CPT®) Hc Pt Prosthetic (S) Train Initial Encounter, Each 15 Min      90151 (CPT®) Hc Orthotic(S) Mgmt/Train Initial Encounter, Each 15min      63070 (CPT®) Hc Pt Aquatic Therapy Ea 15 Min      50771 (CPT®) Hc Pt Orthotic(S)/Prosthetic(S) Encounter, Each 15 Min       (CPT®) Hc Pt Electrical Stim Unattended 20 1    Total  20 1        Therapy Charges for Today     Code Description Service Date Service Provider Modifiers Qty    47699567165 HC PT THER PROC EA 15 MIN 10/4/2018 Latoya Camargo, PTA GP 3    92145269610 HC PT ELECTRICAL STIM UNATTENDED 10/4/2018 Latoya Camargo, PTA  1                    Latoya Camargo, KRISTINA  10/4/2018

## 2018-10-23 ENCOUNTER — HOSPITAL ENCOUNTER (OUTPATIENT)
Dept: PHYSICAL THERAPY | Facility: HOSPITAL | Age: 68
Setting detail: THERAPIES SERIES
Discharge: HOME | End: 2018-10-23

## 2018-10-23 DIAGNOSIS — M25.512 ACUTE PAIN OF LEFT SHOULDER: Primary | ICD-10-CM

## 2018-10-23 PROCEDURE — 97110 THERAPEUTIC EXERCISES: CPT

## 2018-10-23 PROCEDURE — G0283 ELEC STIM OTHER THAN WOUND: HCPCS
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
## 2018-10-25 ENCOUNTER — HOSPITAL ENCOUNTER (OUTPATIENT)
Dept: PHYSICAL THERAPY | Facility: HOSPITAL | Age: 68
Setting detail: THERAPIES SERIES
Discharge: HOME | End: 2018-10-25

## 2018-10-25 DIAGNOSIS — M25.512 ACUTE PAIN OF LEFT SHOULDER: Primary | ICD-10-CM

## 2018-10-25 PROCEDURE — G0283 ELEC STIM OTHER THAN WOUND: HCPCS

## 2018-10-25 PROCEDURE — 97110 THERAPEUTIC EXERCISES: CPT

## 2018-10-25 NOTE — THERAPY TREATMENT NOTE
Outpatient Physical Therapy Ortho Treatment Note  [facility]     Patient Name: Kathleen Joseph  : 1950  MRN: [id number]  Today's Date: 10/25/2018      Visit Date: 10/25/2018     Subjective Improvement 85%  Visits 2 x week x 6 weeks.  Approval given to finish out the 12 visits  RTMD Next week  Recert Date 10-    Left shoulder pain    Visit Dx:    ICD-10-CM ICD-9-CM   1. Acute pain of left shoulder M25.512 719.41       There is no problem list on file for this patient.       Past Medical History:   Diagnosis Date   • Hypertension         Past Surgical History:   Procedure Laterality Date   • BACK SURGERY     • BREAST SURGERY      2 benign breast lumps removed   • COLONOSCOPY N/A 3/26/2018    Procedure: COLONOSCOPY;  Surgeon: Humza Davis DO;  Location: [facility] ENDOSCOPY;  Service: Gastroenterology   • ENDOSCOPY N/A 3/26/2018    Procedure: ESOPHAGOGASTRODUODENOSCOPY;  Surgeon: Humza Davis DO;  Location: [facility] ENDOSCOPY;  Service: Gastroenterology   • TONSILLECTOMY     • TUBAL ABDOMINAL LIGATION               PT Ortho     Row Name 10/23/18 1500       Left Upper Ext    Lt Shoulder Abduction AROM 114  -CP    Lt Shoulder Flexion AROM 102  -CP    Lt Shoulder External Rotation AROM 55  -CP    Lt Shoulder Internal Rotation AROM beltline  -CP       MMT (Manual Muscle Testing)    Lt Upper Ext Lt Shoulder Flexion;Lt Shoulder ABduction;Lt Shoulder Internal Rotation;Lt Shoulder External Rotation  -CP       MMT Left Upper Ext    Lt Shoulder Flexion MMT, Gross Movement (4-/5) good minus  -CP    Lt Shoulder ABduction MMT, Gross Movement (3+/5) fair plus  -CP    Lt Shoulder Internal Rotation MMT, Gross Movement (4/5) good  -CP    Lt Shoulder External Rotation MMT, Gross Movement (4/5) good  -CP      User Key  (r) = Recorded By, (t) = Taken By, (c) = Cosigned By    Initials Name Provider Type    Latoya Cordero, PTA Physical Therapy Assistant                            PT Assessment/Plan      Row Name 10/25/18 1607          PT Assessment    Assessment Comments TTP to post shoulder  -CP        PT Plan    PT Frequency 2x/week  -CP     Predicted Duration of Therapy Intervention (Therapy Eval) 6 weeks  -CP     PT Plan Comments MD note next visits  -CP       User Key  (r) = Recorded By, (t) = Taken By, (c) = Cosigned By    Initials Name Provider Type    CP Latoya Camargo, PTA Physical Therapy Assistant                Modalities     Row Name 10/25/18 1500             Ice    Ice Applied Yes  -CP      Location left shoulder  -CP      Rx Minutes --   20 minutes with IFC  -CP      Ice S/P Rx Yes  -CP         ELECTRICAL STIMULATION    Attended/Unattended Unattended  -CP      Stimulation Type IFC  -CP      Location/Electrode Placement/Other left post shoulder  -CP       PT Electrical Stimulation Unattended (Manual) Minutes 20  -CP        User Key  (r) = Recorded By, (t) = Taken By, (c) = Cosigned By    Initials Name Provider Type    CP Latoya Camargo, PTA Physical Therapy Assistant                Exercises     Row Name 10/25/18 1500             Subjective Comments    Subjective Comments Saw MD today and MD increase weight limit to 5 lb.  She is to finish out her last two therapy appointments then return to work.  reports feeling 85% better.  -CP         Subjective Pain    Able to rate subjective pain? yes  -CP      Pre-Treatment Pain Level 2  -CP      Post-Treatment Pain Level 0  -CP      Subjective Pain Comment numb from ice  -CP         Exercise 1    Exercise Name 1 Pro II level 3  -CP      Time 1 10  -CP      Additional Comments FW/BW  -CP         Exercise 2    Exercise Name 2 Pulleys  -CP      Reps 2 30  -CP      Time 2 Fl and scap  -CP         Exercise 3    Exercise Name 3 scap row on cc  -CP      Sets 3 2  -CP      Reps 3 10  -CP      Time 3 4 plates  -CP         Exercise 4    Exercise Name 4 scap rows high on cc  -CP      Sets 4 2  -CP      Reps 4 10  -CP      Time 4 4 plates  -CP         Exercise  5    Exercise Name 5 lat pull downs on cc  -CP      Sets 5 2  -CP      Reps 5 10  -CP      Time 5 4 plates  -CP         Exercise 6    Exercise Name 6 standing shoulder fl to 90  -CP      Sets 6 2  -CP      Reps 6 10  -CP      Time 6 1 lb  -CP         Exercise 7    Exercise Name 7 standing should scap to 90  -CP      Sets 7 2  -CP      Reps 7 10  -CP      Time 7 1 lb  -CP         Exercise 8    Exercise Name 8 supine clocks with tband  -CP      Reps 8 10 each   -CP      Time 8 yellow  -CP         Exercise 9    Exercise Name 9 PROM  -CP      Time 9 5  -CP        User Key  (r) = Recorded By, (t) = Taken By, (c) = Cosigned By    Initials Name Provider Type    CP Latoya Camargo, KRISTINA Physical Therapy Assistant                               PT OP Goals     Row Name 10/25/18 1500          PT Short Term Goals    STG Date to Achieve 10/23/18  -CP     STG 1 patient will be independent with HEP  -CP     STG 1 Progress Progressing  -CP     STG 2 patient will presents with 0/10 pain at rest or with movement  -CP     STG 2 Progress Progressing  -CP     STG 3 patient will present with AROM of left shoulder WNL  -CP     STG 3 Progress Progressing  -CP     STG 4 patient will present with 4+/5 strength of her right shoulder in ABD/flex on MMT  -CP     STG 4 Progress Not Met  -CP        Time Calculation    PT Goal Re-Cert Due Date 10/23/18  -CP       User Key  (r) = Recorded By, (t) = Taken By, (c) = Cosigned By    Initials Name Provider Type    CP Latoya Camargo PTA Physical Therapy Assistant          Therapy Education  Education Details: supine clocks with tband  Given: HEP  Program: New  How Provided: Verbal, Demonstration, Written  Provided to: Patient  Level of Understanding: Verbalized, Demonstrated              Time Calculation:   Start Time: 1515  Stop Time: 1620  Time Calculation (min): 65 min  Total Timed Code Minutes- PT: 45 minute(s)  Therapy Suggested Charges     Code   Minutes Charges    16289 (CPT®)  Pt Neuromusc  Re Education Ea 15 Min      61585 (CPT®) Hc Pt Ther Proc Ea 15 Min      68676 (CPT®) Hc Gait Training Ea 15 Min      96714 (CPT®) Hc Pt Therapeutic Act Ea 15 Min      19646 (CPT®) Hc Pt Manual Therapy Ea 15 Min      55051 (CPT®) Hc Pt Ther Massage- Per 15 Min      88813 (CPT®) Hc Pt Iontophoresis Ea 15 Min      95794 (CPT®) Hc Pt Elec Stim Ea-Per 15 Min      84956 (CPT®) Hc Pt Ultrasound Ea 15 Min      89168 (CPT®) Hc Pt Self Care/Mgmt/Train Ea 15 Min      70895 (CPT®) Hc Pt Prosthetic (S) Train Initial Encounter, Each 15 Min      03314 (CPT®) Hc Orthotic(S) Mgmt/Train Initial Encounter, Each 15min      94905 (CPT®) Hc Pt Aquatic Therapy Ea 15 Min      75283 (CPT®) Hc Pt Orthotic(S)/Prosthetic(S) Encounter, Each 15 Min       (CPT®) Hc Pt Electrical Stim Unattended 20 1    Total  20 1        Therapy Charges for Today     Code Description Service Date Service Provider Modifiers Qty    22416441677 HC PT THER PROC EA 15 MIN 10/25/2018 Latoya Camargo, PTA GP 3    41579361670 HC PT ELECTRICAL STIM UNATTENDED 10/25/2018 Latoya Camargo, PTA  1    47443305410 HC PT THER SUPP EA 15 MIN 10/25/2018 Latoya Camargo, PTA GP 1                    Latoya Camargo, PTA  10/25/2018

## 2018-10-30 ENCOUNTER — HOSPITAL ENCOUNTER (OUTPATIENT)
Dept: PHYSICAL THERAPY | Facility: HOSPITAL | Age: 68
Setting detail: THERAPIES SERIES
Discharge: HOME | End: 2018-10-30

## 2018-10-30 DIAGNOSIS — M25.512 ACUTE PAIN OF LEFT SHOULDER: Primary | ICD-10-CM

## 2018-10-30 PROCEDURE — 97110 THERAPEUTIC EXERCISES: CPT

## 2018-10-30 PROCEDURE — G0283 ELEC STIM OTHER THAN WOUND: HCPCS

## 2018-10-30 NOTE — THERAPY TREATMENT NOTE
Outpatient Physical Therapy Ortho Treatment Note  [facility]     Patient Name: Kathleen Joseph  : 1950  MRN: [id number]  Today's Date: 10/30/2018      Visit Date: 10/30/2018     Subjective Improvement 75-80%  Visits 11/12  Visits approved 2x week for 6 weeks.  Approval given to finish out her 12 visits  RTMD 2018 MD note faxed today  Recert date 10-    Left shoulder pain      Visit Dx:    ICD-10-CM ICD-9-CM   1. Acute pain of left shoulder M25.512 719.41       There is no problem list on file for this patient.       Past Medical History:   Diagnosis Date   • Hypertension         Past Surgical History:   Procedure Laterality Date   • BACK SURGERY     • BREAST SURGERY      2 benign breast lumps removed   • COLONOSCOPY N/A 3/26/2018    Procedure: COLONOSCOPY;  Surgeon: Humza Davis DO;  Location: [facility] ENDOSCOPY;  Service: Gastroenterology   • ENDOSCOPY N/A 3/26/2018    Procedure: ESOPHAGOGASTRODUODENOSCOPY;  Surgeon: Humza Davis DO;  Location: [facility] ENDOSCOPY;  Service: Gastroenterology   • TONSILLECTOMY     • TUBAL ABDOMINAL LIGATION               PT Ortho     Row Name 10/30/18 1600       Left Upper Ext    Lt Shoulder Abduction AROM 120  -CP    Lt Shoulder Flexion AROM 102  -CP    Lt Shoulder External Rotation AROM 60  -CP    Lt Shoulder Internal Rotation AROM beltline  -CP       MMT Left Upper Ext    Lt Shoulder Flexion MMT, Gross Movement (4-/5) good minus  -CP    Lt Shoulder ABduction MMT, Gross Movement (4-/5) good minus  -CP    Lt Shoulder Internal Rotation MMT, Gross Movement (4/5) good  -CP    Lt Shoulder External Rotation MMT, Gross Movement (4/5) good  -CP    Row Name 10/30/18 1500       Subjective Comments    Subjective Comments Still having problems raising arm.    -CP       Subjective Pain    Able to rate subjective pain? yes  -CP    Pre-Treatment Pain Level 3  -CP       Special Tests/Palpation    Special Tests/Palpation Shoulder  -CP      User Key  (r) =  Recorded By, (t) = Taken By, (c) = Cosigned By    Initials Name Provider Type    CP Latoya Camargo, PTA Physical Therapy Assistant                            PT Assessment/Plan     Row Name 10/30/18 1610          PT Assessment    Assessment Comments TTP to Subscap, +drop arm test, +lift off test, painful [place]briens  -CP        PT Plan    PT Frequency 2x/week  -CP     Predicted Duration of Therapy Intervention (Therapy Eval) 6 weeks  -CP     PT Plan Comments Cont wtih POC. Recheck next visit one more approved visit  -CP       User Key  (r) = Recorded By, (t) = Taken By, (c) = Cosigned By    Initials Name Provider Type    CP Latoya Camargo, PTA Physical Therapy Assistant                Modalities     Row Name 10/30/18 1500             Ice    Ice Applied Yes  -CP      Location left shoudler  -CP      Rx Minutes --   20 minutes with ifc  -CP      Ice S/P Rx Yes  -CP         ELECTRICAL STIMULATION    Attended/Unattended Unattended  -CP      Stimulation Type IFC  -CP      Location/Electrode Placement/Other left shoulder  -CP       PT Electrical Stimulation Unattended (Manual) Minutes 20  -CP        User Key  (r) = Recorded By, (t) = Taken By, (c) = Cosigned By    Initials Name Provider Type    CP Latoya Camargo, PTA Physical Therapy Assistant                Exercises     Row Name 10/30/18 1500             Subjective Comments    Subjective Comments Still having problems raising arm.    -CP         Subjective Pain    Able to rate subjective pain? yes  -CP      Pre-Treatment Pain Level 3  -CP      Post-Treatment Pain Level 0  -CP      Subjective Pain Comment numb from ice and IFC  -CP         Exercise 1    Exercise Name 1 Pro II level 3  -CP      Time 1 10  -CP      Additional Comments FW/BW  -CP         Exercise 2    Exercise Name 2 pulleys  -CP      Reps 2 30  -CP      Time 2 fl and ab  -CP         Exercise 3    Exercise Name 3 standing AROM fl and AB  -CP      Reps 3 20 each  -CP         Exercise 4    Exercise  Name 4 no moniey  -CP      Reps 4 20  -CP         Exercise 5    Exercise Name 5 standing fl and scap to 90  -CP      Sets 5 2  -CP      Reps 5 10  -CP      Time 5 1 lb  -CP         Exercise 6    Exercise Name 6 supine clocks  -CP      Reps 6 10 each  -CP      Time 6 yellow tband  -CP         Exercise 7    Exercise Name 7 SL'ing AB to 90  -CP      Reps 7 20  -CP         Exercise 8    Exercise Name 8 SL'ing shoulder AD with tband  -CP      Reps 8 15  -CP      Time 8 yellow  -CP        User Key  (r) = Recorded By, (t) = Taken By, (c) = Cosigned By    Initials Name Provider Type    CP Latyoa Camargo, KRISTINA Physical Therapy Assistant                               PT OP Goals     Row Name 10/30/18 1600          PT Short Term Goals    STG Date to Achieve 10/23/18  -CP     STG 1 patient will be independent with HEP  -CP     STG 1 Progress Progressing  -CP     STG 2 patient will presents with 0/10 pain at rest or with movement  -CP     STG 2 Progress Progressing  -CP     STG 3 patient will present with AROM of left shoulder WNL  -CP     STG 3 Progress Progressing  -CP     STG 4 patient will present with 4+/5 strength of her right shoulder in ABD/flex on MMT  -CP     STG 4 Progress Not Met  -CP        Time Calculation    PT Goal Re-Cert Due Date 10/23/18  -CP       User Key  (r) = Recorded By, (t) = Taken By, (c) = Cosigned By    Initials Name Provider Type    CP Latoya Camargo, KRISTINA Physical Therapy Assistant                         Time Calculation:   Start Time: 1520  Stop Time: 1630  Time Calculation (min): 70 min  Total Timed Code Minutes- PT: 45 minute(s)  Therapy Suggested Charges     Code   Minutes Charges    18173 (CPT®) Hc Pt Neuromusc Re Education Ea 15 Min      21129 (CPT®) Hc Pt Ther Proc Ea 15 Min      72745 (CPT®) Hc Gait Training Ea 15 Min      02895 (CPT®) Hc Pt Therapeutic Act Ea 15 Min      50406 (CPT®) Hc Pt Manual Therapy Ea 15 Min      98239 (CPT®) Hc Pt Ther Massage- Per 15 Min      68537 (CPT®) Hc  Pt Iontophoresis Ea 15 Min      87887 (CPT®) Hc Pt Elec Stim Ea-Per 15 Min      03710 (CPT®) Hc Pt Ultrasound Ea 15 Min      26009 (CPT®) Hc Pt Self Care/Mgmt/Train Ea 15 Min      73881 (CPT®) Hc Pt Prosthetic (S) Train Initial Encounter, Each 15 Min      82497 (CPT®) Hc Orthotic(S) Mgmt/Train Initial Encounter, Each 15min      20762 (CPT®) Hc Pt Aquatic Therapy Ea 15 Min      22771 (CPT®) Hc Pt Orthotic(S)/Prosthetic(S) Encounter, Each 15 Min       (CPT®) Hc Pt Electrical Stim Unattended 20 1    Total  20 1        Therapy Charges for Today     Code Description Service Date Service Provider Modifiers Qty    16225002074 HC PT THER PROC EA 15 MIN 10/30/2018 Latoya Camargo, PTA GP 3    96099367617 HC PT ELECTRICAL STIM UNATTENDED 10/30/2018 Latoya Camargo, PTA  1    83674989592 HC PT THER SUPP EA 15 MIN 10/30/2018 Latoya Camargo, PTA GP 1                    Latoya Camargo, KRISTINA  10/30/2018

## 2018-11-01 ENCOUNTER — APPOINTMENT (OUTPATIENT)
Dept: PHYSICAL THERAPY | Facility: HOSPITAL | Age: 68
End: 2018-11-01

## 2018-11-02 ENCOUNTER — TRANSCRIBE ORDERS (OUTPATIENT)
Dept: MRI IMAGING | Facility: HOSPITAL | Age: 68
End: 2018-11-02

## 2018-11-02 DIAGNOSIS — M25.512 LEFT SHOULDER PAIN, UNSPECIFIED CHRONICITY: Primary | ICD-10-CM

## 2018-11-06 ENCOUNTER — APPOINTMENT (OUTPATIENT)
Dept: PHYSICAL THERAPY | Facility: HOSPITAL | Age: 68
End: 2018-11-06

## 2018-11-08 ENCOUNTER — APPOINTMENT (OUTPATIENT)
Dept: PHYSICAL THERAPY | Facility: HOSPITAL | Age: 68
End: 2018-11-08

## 2018-11-12 ENCOUNTER — HOSPITAL ENCOUNTER (OUTPATIENT)
Dept: MRI IMAGING | Facility: HOSPITAL | Age: 68
Discharge: HOME | End: 2018-11-12
Admitting: GENERAL PRACTICE

## 2018-11-12 DIAGNOSIS — M25.512 LEFT SHOULDER PAIN, UNSPECIFIED CHRONICITY: ICD-10-CM

## 2018-11-12 PROCEDURE — 73221 MRI JOINT UPR EXTREM W/O DYE: CPT

## 2018-12-05 ENCOUNTER — HOSPITAL ENCOUNTER (OUTPATIENT)
Dept: MRI IMAGING | Facility: HOSPITAL | Age: 68
Discharge: HOME | End: 2018-12-05

## 2018-12-05 DIAGNOSIS — M89.8X2 PAIN IN HUMERUS: ICD-10-CM

## 2020-01-07 ENCOUNTER — TRANSCRIBE ORDERS (OUTPATIENT)
Dept: PHYSICAL THERAPY | Facility: HOSPITAL | Age: 70
End: 2020-01-07

## 2020-01-07 DIAGNOSIS — M70.72 BURSITIS OF LEFT HIP, UNSPECIFIED BURSA: Primary | ICD-10-CM

## 2020-01-08 ENCOUNTER — HOSPITAL ENCOUNTER (OUTPATIENT)
Dept: PHYSICAL THERAPY | Facility: HOSPITAL | Age: 70
Setting detail: THERAPIES SERIES
Discharge: HOME | End: 2020-01-08

## 2020-01-08 DIAGNOSIS — M70.72 BURSITIS OF LEFT HIP, UNSPECIFIED BURSA: Primary | ICD-10-CM

## 2020-01-08 PROCEDURE — 97161 PT EVAL LOW COMPLEX 20 MIN: CPT | Performed by: PHYSICAL THERAPIST

## 2020-01-08 PROCEDURE — 97110 THERAPEUTIC EXERCISES: CPT | Performed by: PHYSICAL THERAPIST

## 2020-01-08 NOTE — THERAPY EVALUATION
Outpatient Physical Therapy Ortho Initial Evaluation  [facility]     Patient Name: Kathleen Joseph  : 1950  MRN: [id number]  Today's Date: 2020      Visit Date: 2020  Visit   Return to MD: KASSIE  Re-cert date: 20  There is no problem list on file for this patient.       Past Medical History:   Diagnosis Date   • Elevated cholesterol    • GERD (gastroesophageal reflux disease)    • Hypertension         Past Surgical History:   Procedure Laterality Date   • BACK SURGERY     • BREAST SURGERY      2 benign breast lumps removed   • COLONOSCOPY N/A 3/26/2018    Procedure: COLONOSCOPY;  Surgeon: Humza Davis DO;  Location: [facility] ENDOSCOPY;  Service: Gastroenterology   • ENDOSCOPY N/A 3/26/2018    Procedure: ESOPHAGOGASTRODUODENOSCOPY;  Surgeon: Humza Davis DO;  Location: [facility] ENDOSCOPY;  Service: Gastroenterology   • LUMBAR DISCECTOMY     • TONSILLECTOMY     • TUBAL ABDOMINAL LIGATION         Visit Dx:     ICD-10-CM ICD-9-CM   1. Bursitis of left hip, unspecified bursa M70.72 726.5     Medications (Admitted on 2020)     aspirin 81 MG EC tablet     cyclobenzaprine (FLEXERIL) 10 MG tablet     escitalopram (LEXAPRO) 10 MG tablet     Esomeprazole Magnesium (NEXIUM PO)     HYDROcodone-acetaminophen (NORCO) 7.5-325 MG per tablet     lisinopril (PRINIVIL,ZESTRIL) 5 MG tablet     lovastatin (MEVACOR) 10 MG tablet     nitroglycerin (NITROSTAT) 0.4 MG SL tablet     raNITIdine (ZANTAC) 150 MG tablet     traMADol (ULTRAM) 50 MG tablet     triamterene-hydrochlorothiazide (MAXZIDE-25) 37.5-25 MG per tablet      Allergies       PenicillinsUnknown (See Comments)             PT Ortho     Row Name 20 0900       Subjective Comments    Subjective Comments  68 yo female with L hip bursitis for over a year, had multiple steroid injections with last few not alleviating her pain much. Now s/p L hip bursectomy 6 weeks ago, on 19. Pt relegated to sleeping in recliner chair as of  late due to L hip pain.   -BS       Precautions and Contraindications    Precautions/Limitations  no known precautions/limitations  -BS       Subjective Pain    Able to rate subjective pain?  yes  -BS    Pre-Treatment Pain Level  4  -BS       Posture/Observations    Posture/Observations Comments  TTP along L greater trochanter, level pelvis  -BS       General ROM    GENERAL ROM COMMENTS  AROM: L hip-flex 69 deg R hip flex 95 deg L knee 0-108 deg R knee 0-124 deg L hip abd 10 deg L hip add 15 deg  -BS       MMT (Manual Muscle Testing)    General MMT Comments  MMT: R LE 5/5 L LE-hip flex 3+/5 hip abd 3-/5 knee ext 4+/5 knee flex 4+/5 ankle DF 5/5  -BS       Sensation    Light Touch  No apparent deficits  -BS      User Key  (r) = Recorded By, (t) = Taken By, (c) = Cosigned By    Initials Name Provider Type    Johnathan Diaz, PT Physical Therapist                      Therapy Education  Education Details: Supine Ham S, Hook Lying Leg Lifts, Sit to Stand, Clamshells  Given: HEP, Symptoms/condition management  Program: New  How Provided: Verbal, Demonstration, Written  Provided to: Patient, Caregiver  Level of Understanding: Verbalized, Demonstrated     PT OP Goals     Row Name 01/08/20 1000          PT Short Term Goals    STG Date to Achieve  01/29/20  -BS     STG 1  Pt indep with HEP  -BS     STG 1 Progress  New  -BS     STG 2  Improve L hip flex/abduction MMT to 4+/5  -BS     STG 2 Progress  New  -BS     STG 3  Improve L hip flex/abd AROM to 90 deg/20 deg respectively  -BS     STG 3 Progress  New  -BS     STG 4  Reduce L hip pain by 25-50% with sleeping and prolonged sitting  -BS     STG 4 Progress  New  -BS     STG 5  Able to resume sleeping back in her bed at night  -BS     STG 5 Progress  New  -BS        Time Calculation    PT Goal Re-Cert Due Date  01/29/20  -BS       User Key  (r) = Recorded By, (t) = Taken By, (c) = Cosigned By    Initials Name Provider Type    Johnathan Diaz, PT Physical Therapist     "      PT Assessment/Plan     Row Name 01/08/20 0930          PT Assessment    Functional Limitations  Performance in work activities;Performance in self-care ADL;Performance in sport activities;Performance in leisure activities;Limitation in home management;Impaired gait  -BS     Impairments  Impaired flexibility;Gait;Muscle strength;Pain;Posture;Range of motion  -BS     Assessment Comments  Acute L hip pain s/p L hip bursectomy on 11/27/19.  -BS     Please refer to paper survey for additional self-reported information  Yes  -BS     Rehab Potential  Good  -BS     Patient/caregiver participated in establishment of treatment plan and goals  Yes  -BS     Patient would benefit from skilled therapy intervention  Yes  -BS        PT Plan    PT Frequency  2x/week  -BS     Predicted Duration of Therapy Intervention (Therapy Eval)  3 weeks  -BS     Planned CPT's?  PT EVAL LOW COMPLEXITY: 00355;PT THER PROC EA 15 MIN: 86743;PT THER ACT EA 15 MIN: 52976;PT NEUROMUSC RE-EDUCATION EA 15 MIN: 31481;PT HOT OR COLD PACK TREAT MCARE;PT ELECTRICAL STIM UNATTEND: ;PT THER SUPP EA 15 MIN  -BS     Physical Therapy Interventions (Optional Details)  balance training;home exercise program;modalities;manual therapy techniques;neuromuscular re-education;patient/family education;ROM (Range of Motion);stair training;strengthening;stretching;transfer training  -BS     PT Plan Comments  f/u with step ups, 4\"; address hip abd/flexor strengthening. Add clam shells resisted with TB.  -BS       User Key  (r) = Recorded By, (t) = Taken By, (c) = Cosigned By    Initials Name Provider Type    BS Johnathan Herrera, PT Physical Therapist          Modalities     Row Name 01/08/20 0900             Ice    Ice Applied  Yes  -BS      Location  L Hip  -BS      Rx Minutes  10 mins  -BS      Ice S/P Rx  Yes  -BS      Patient reports will apply ice at home to involved area  Yes  -BS        User Key  (r) = Recorded By, (t) = Taken By, (c) = Cosigned By    " Initials Name Provider Type    Johnathan Diaz, PT Physical Therapist        OP Exercises     Row Name 01/08/20 0900             Subjective Comments    Subjective Comments  68 yo female with L hip bursitis for over a year, had multiple steroid injections with last few not alleviating her pain much. Now s/p L hip bursectomy 6 weeks ago, on 11/27/19. Pt relegated to sleeping in recliner chair as of late due to L hip pain.   -BS         Subjective Pain    Able to rate subjective pain?  yes  -BS      Pre-Treatment Pain Level  4  -BS      Post-Treatment Pain Level  4  -BS         Exercise 1    Exercise Name 1  Supine Ham S  -BS      Reps 1  3  -BS      Time 1  30 sec  -BS         Exercise 2    Exercise Name 2  Clamshells  -BS      Sets 2  2  -BS      Reps 2  10  -BS         Exercise 3    Exercise Name 3  Hook Lying Leg Lift  -BS      Reps 3  10  -BS         Exercise 4    Exercise Name 4  Bridging w/ Hip Add  -BS      Reps 4  10  -BS         Exercise 5    Exercise Name 5  Sit to Stand  -BS      Reps 5  10  -BS         Exercise 6    Exercise Name 6  ice  -BS      Time 6  10  -BS        User Key  (r) = Recorded By, (t) = Taken By, (c) = Cosigned By    Initials Name Provider Type    Johnathan Diaz, PT Physical Therapist                        Outcome Measure Options: Lower Extremity Functional Scale (LEFS)  Lower Extremity Functional Index  Any of your usual work, housework or school activities: Moderate difficulty  Your usual hobbies, recreational or sporting activities: Moderate difficulty  Getting into or out of the bath: No difficulty  Walking between rooms: No difficulty  Putting on your shoes or socks: Moderate difficulty  Squatting: Moderate difficulty  Lifting an object, like a bag of groceries from the floor: Moderate difficulty  Performing light activities around your home: A little bit of difficulty  Performing heavy activities around your home: Moderate difficulty  Getting into or out of a car: Moderate  difficulty  Walking 2 blocks: Moderate difficulty  Walking a mile: Quite a bit of difficulty  Going up or down 10 stairs (about 1 flight of stairs): Moderate difficulty  Standing for 1 hour: Moderate difficulty  Sitting for 1 hour: A little bit of difficulty  Running on even ground: Extreme difficulty or unable to perform activity  Running on uneven ground: Extreme difficulty or unable to perform activity  Making sharp turns while running fast: Extreme difficulty or unable to perform activity  Hopping: Extreme difficulty or unable to perform activity  Rolling over in bed: A little bit of difficulty  Total: 38      Time Calculation:     Start Time: 0930  Stop Time: 1013  Time Calculation (min): 43 min  PT Non-Billable Time (min): 10 min  Total Timed Code Minutes- PT: 33 minute(s)     Therapy Charges for Today     Code Description Service Date Service Provider Modifiers Qty    24607611853 HC PT THER SUPP EA 15 MIN 1/8/2020 Johnathan Herrera, PT GP 1    43280839672 HC PT EVAL LOW COMPLEXITY 1 1/8/2020 Johnathan Herrera, PT GP 1    49484120642 HC PT THER PROC EA 15 MIN 1/8/2020 Johnathan Herrera, PT GP 1          PT G-Codes  Outcome Measure Options: Lower Extremity Functional Scale (LEFS)  Total: 38         Johnathan Herrera PT  1/8/2020

## 2020-01-13 ENCOUNTER — HOSPITAL ENCOUNTER (OUTPATIENT)
Dept: PHYSICAL THERAPY | Facility: HOSPITAL | Age: 70
Setting detail: THERAPIES SERIES
Discharge: HOME | End: 2020-01-13

## 2020-01-13 DIAGNOSIS — M70.72 BURSITIS OF LEFT HIP, UNSPECIFIED BURSA: Primary | ICD-10-CM

## 2020-01-13 PROCEDURE — 97110 THERAPEUTIC EXERCISES: CPT

## 2020-01-13 NOTE — THERAPY TREATMENT NOTE
Outpatient Physical Therapy Ortho Treatment Note  [facility]     Patient Name: Kathleen Joseph  : 1950  MRN: [id number]  Today's Date: 2020      Visit Date: 2020  Subjective Improvement: 0   Visits Attended: 2/2   Visits Approved Medicare 80%   MD visit: TBD   Recert Date: 2020       Visit Dx:    ICD-10-CM ICD-9-CM   1. Bursitis of left hip, unspecified bursa M70.72 726.5       There is no problem list on file for this patient.       Past Medical History:   Diagnosis Date   • Elevated cholesterol    • GERD (gastroesophageal reflux disease)    • Hypertension         Past Surgical History:   Procedure Laterality Date   • BACK SURGERY     • BREAST SURGERY      2 benign breast lumps removed   • COLONOSCOPY N/A 3/26/2018    Procedure: COLONOSCOPY;  Surgeon: Humza Davis DO;  Location: [facility] ENDOSCOPY;  Service: Gastroenterology   • ENDOSCOPY N/A 3/26/2018    Procedure: ESOPHAGOGASTRODUODENOSCOPY;  Surgeon: Humza Davis DO;  Location: [facility] ENDOSCOPY;  Service: Gastroenterology   • LUMBAR DISCECTOMY     • TONSILLECTOMY     • TUBAL ABDOMINAL LIGATION         PT Ortho     Row Name 20 0800       Subjective Comments    Subjective Comments  pt reported she hurts worse at night  -TL       Precautions and Contraindications    Precautions/Limitations  no known precautions/limitations  -TL       Subjective Pain    Able to rate subjective pain?  yes  -TL    Pre-Treatment Pain Level  3  -TL    Post-Treatment Pain Level  0 feeling numb  -TL      User Key  (r) = Recorded By, (t) = Taken By, (c) = Cosigned By    Initials Name Provider Type    TL Yazmin Sheth, PTA Physical Therapy Assistant                      PT Assessment/Plan     Row Name 20 0900          PT Assessment    Assessment Comments  pt does have some soreness during the day but more sharp pain at night. Pt working toward goals.Pt working toward strengthening and stretches goals.   -TL        PT Plan    PT  Frequency  2x/week  -TL     Predicted Duration of Therapy Intervention (Therapy Eval)  3 weeks  -TL     PT Plan Comments  Spoke with evaluating therapist to add US to the left hip. Therapist gave the okay to start US next visit.  -TL       User Key  (r) = Recorded By, (t) = Taken By, (c) = Cosigned By    Initials Name Provider Type    Yazmin Farah PTA Physical Therapy Assistant          Modalities     Row Name 01/13/20 0800             Ice    Ice Applied  Yes  -TL      Location  left hip  -TL      Rx Minutes  15 mins  -TL      Ice S/P Rx  Yes  -TL      Patient reports will apply ice at home to involved area  Yes  -TL        User Key  (r) = Recorded By, (t) = Taken By, (c) = Cosigned By    Initials Name Provider Type    Yazmin Farah PTA Physical Therapy Assistant        OP Exercises     Row Name 01/13/20 0800             Subjective Comments    Subjective Comments  pt reported she hurts worse at night  -TL         Subjective Pain    Able to rate subjective pain?  yes  -TL      Pre-Treatment Pain Level  3  -TL      Post-Treatment Pain Level  0 feeling numb  -TL         Exercise 1    Exercise Name 1  Pro ll legs only level 1  -TL      Time 1  10 mins  -TL         Exercise 2    Exercise Name 2  st ham S  -TL      Reps 2  2  -TL      Time 2  30 sec hold  -TL         Exercise 3    Exercise Name 3  incline S  -TL      Reps 3  2  -TL      Time 3  30 sec hold  -TL         Exercise 4    Exercise Name 4  step up fwd/lat  -TL      Sets 4  2  -TL      Reps 4  10 each  -TL         Exercise 5    Exercise Name 5  bridging with hip add using ball  -TL      Sets 5  2/10  -TL      Time 5  5 sec hold  -TL         Exercise 6    Exercise Name 6  sit to stands with ball hip add  -TL      Sets 6  2  -TL      Reps 6  10  -TL         Exercise 7    Exercise Name 7  clams with RTB  -TL      Sets 7  2  -TL      Reps 7  10  -TL         Exercise 8    Exercise Name 8  LUIS  -TL      Time 8  3 mins  -TL        User Key  (r) = Recorded  By, (t) = Taken By, (c) = Cosigned By    Initials Name Provider Type    TL Yazmin Sheth PTA Physical Therapy Assistant                       PT OP Goals     Row Name 01/13/20 0840          PT Short Term Goals    STG Date to Achieve  01/29/20  -TL     STG 1  Pt indep with HEP  -TL     STG 1 Progress  Ongoing  -TL     STG 2  Improve L hip flex/abduction MMT to 4+/5  -TL     STG 2 Progress  Ongoing  -TL     STG 3  Improve L hip flex/abd AROM to 90 deg/20 deg respectively  -TL     STG 3 Progress  Ongoing  -TL     STG 4  Reduce L hip pain by 25-50% with sleeping and prolonged sitting  -TL     STG 4 Progress  Ongoing  -TL     STG 5  Able to resume sleeping back in her bed at night  -TL     STG 5 Progress  Ongoing  -TL        Time Calculation    PT Goal Re-Cert Due Date  01/29/20  -TL       User Key  (r) = Recorded By, (t) = Taken By, (c) = Cosigned By    Initials Name Provider Type    TL Yazmin Sheth PTA Physical Therapy Assistant          Therapy Education  Education Details: LUIS, tamie with RTB, ice prior to bed., talked about positions to decrease pain  Given: HEP, Symptoms/condition management, Pain management, Posture/body mechanics  Program: Reinforced, Progressed  How Provided: Verbal, Demonstration, Written  Provided to: Patient  Level of Understanding: Teach back education performed, Verbalized, Demonstrated              Time Calculation:   Start Time: 0840  Stop Time: 0942  Time Calculation (min): 62 min  PT Non-Billable Time (min): 15 min  Total Timed Code Minutes- PT: 47 minute(s)  Therapy Charges for Today     Code Description Service Date Service Provider Modifiers Qty    00607231458 HC PT THER PROC EA 15 MIN 1/13/2020 Yazmin Sheth PTA GP 3                    Yazmin Sheth PTA  1/13/2020

## 2020-01-15 ENCOUNTER — HOSPITAL ENCOUNTER (OUTPATIENT)
Dept: PHYSICAL THERAPY | Facility: HOSPITAL | Age: 70
Setting detail: THERAPIES SERIES
Discharge: HOME | End: 2020-01-15

## 2020-01-15 DIAGNOSIS — M70.72 BURSITIS OF LEFT HIP, UNSPECIFIED BURSA: Primary | ICD-10-CM

## 2020-01-15 PROCEDURE — 97110 THERAPEUTIC EXERCISES: CPT | Performed by: PHYSICAL THERAPIST

## 2020-01-15 NOTE — THERAPY TREATMENT NOTE
Outpatient Physical Therapy Ortho Treatment Note  [facility]     Patient Name: Kathleen Joseph  : 1950  MRN: [id number]  Today's Date: 1/15/2020      Visit Date: 01/15/2020  Subjective Improvement: 0   Visits Attended: 3/3   Visits Approved Medicare 80%   MD visit: TBD   Recert Date: 2020       Visit Dx:    ICD-10-CM ICD-9-CM   1. Bursitis of left hip, unspecified bursa M70.72 726.5       There is no problem list on file for this patient.       Past Medical History:   Diagnosis Date   • Elevated cholesterol    • GERD (gastroesophageal reflux disease)    • Hypertension         Past Surgical History:   Procedure Laterality Date   • BACK SURGERY     • BREAST SURGERY      2 benign breast lumps removed   • COLONOSCOPY N/A 3/26/2018    Procedure: COLONOSCOPY;  Surgeon: Humza Davis DO;  Location: [facility] ENDOSCOPY;  Service: Gastroenterology   • ENDOSCOPY N/A 3/26/2018    Procedure: ESOPHAGOGASTRODUODENOSCOPY;  Surgeon: Humza Davis DO;  Location: [facility] ENDOSCOPY;  Service: Gastroenterology   • LUMBAR DISCECTOMY     • TONSILLECTOMY     • TUBAL ABDOMINAL LIGATION         PT Ortho     Row Name 01/15/20 0800       Precautions and Contraindications    Precautions/Limitations  no known precautions/limitations  -BS      User Key  (r) = Recorded By, (t) = Taken By, (c) = Cosigned By    Initials Name Provider Type    Johnathan Diaz, PT Physical Therapist                      PT Assessment/Plan     Row Name 01/15/20 0800          PT Assessment    Assessment Comments  Patient has minimal increase in hip pain post exercise but quickly resolves.   -BS        PT Plan    PT Frequency  2x/week  -BS     Predicted Duration of Therapy Intervention (Therapy Eval)  3 weeks  -BS     PT Plan Comments  Start US next visit.  -BS       User Key  (r) = Recorded By, (t) = Taken By, (c) = Cosigned By    Initials Name Provider Type    Johnathan Diaz, PT Physical Therapist          Modalities     Row Name  "01/15/20 0900             Ice    Location  left hip  -BS      Rx Minutes  15 mins  -BS      Ice S/P Rx  Yes  -BS      Patient reports will apply ice at home to involved area  Yes  -BS        User Key  (r) = Recorded By, (t) = Taken By, (c) = Cosigned By    Initials Name Provider Type    BS Johnathan Herrera PT Physical Therapist        OP Exercises     Row Name 01/15/20 0800             Subjective Comments    Subjective Comments  Patient states no change in hip pain; still worse at night.  -BS         Subjective Pain    Able to rate subjective pain?  yes  -BS      Pre-Treatment Pain Level  3  -BS      Post-Treatment Pain Level  3  -BS         Total Minutes    20143 - PT Therapeutic Exercise Minutes  51  -BS         Exercise 1    Exercise Name 1  Pro II LE, L=1  -BS      Time 1  10 min  -BS         Exercise 2    Exercise Name 2  St Ham S  -BS      Reps 2  2  -BS      Time 2  30 sec hold  -BS         Exercise 3    Exercise Name 3  Incline S  -BS      Reps 3  2  -BS      Time 3  30 sec hold  -BS         Exercise 4    Exercise Name 4  Step Up fwd/lat  -BS      Sets 4  2  -BS      Reps 4  10  -BS      Additional Comments  6\" step  -BS         Exercise 5    Exercise Name 5  Hip Hikes  -BS      Sets 5  1  -BS      Reps 5  10  -BS      Additional Comments  6\" step  -BS         Exercise 6    Exercise Name 6  Fwd Lunge S  -BS      Reps 6  3  -BS      Time 6  30 sec hold  -BS         Exercise 7    Exercise Name 7  SL Balance on Airex  -BS      Reps 7  5  -BS      Time 7  30 sec goal  -BS      Additional Comments  between 4-11 sec max  -BS         Exercise 8    Exercise Name 8  Mini Squat  -BS      Sets 8  1  -BS      Reps 8  10  -BS         Exercise 9    Exercise Name 9  Sit to Stand w/ ball hip add  -BS      Sets 9  2  -BS      Reps 9  10  -BS         Exercise 10    Exercise Name 10  LUIS  -BS      Time 10  3 min  -BS         Exercise 11    Exercise Name 11  Clamshells  -BS      Sets 11  2  -BS      Reps 11  10  -BS      " Additional Comments  GTB  -BS         Exercise 12    Exercise Name 12  Ice  -BS      Time 12  15 min  -BS        User Key  (r) = Recorded By, (t) = Taken By, (c) = Cosigned By    Initials Name Provider Type    Johnathan Diaz PT Physical Therapist                       PT OP Goals     Row Name 01/15/20 0900 01/15/20 0800       PT Short Term Goals    STG Date to Achieve  01/29/20  -BS  --    STG 1  Pt indep with HEP  -BS  --    STG 1 Progress  Ongoing  -BS  --    STG 2  Improve L hip flex/abduction MMT to 4+/5  -BS  --    STG 2 Progress  Ongoing  -BS  --    STG 3  Improve L hip flex/abd AROM to 90 deg/20 deg respectively  -BS  --    STG 3 Progress  Ongoing  -BS  --    STG 4  Reduce L hip pain by 25-50% with sleeping and prolonged sitting  -BS  --    STG 4 Progress  Ongoing  -BS  --    STG 5  Able to resume sleeping back in her bed at night  -BS  --    STG 5 Progress  Ongoing  -BS  --       Time Calculation    PT Goal Re-Cert Due Date  --  -BS  01/29/20  -BS      User Key  (r) = Recorded By, (t) = Taken By, (c) = Cosigned By    Initials Name Provider Type    Johnathan Diaz PT Physical Therapist          Therapy Education  Given: HEP, Symptoms/condition management, Pain management, Posture/body mechanics  Program: Reinforced  How Provided: Verbal, Demonstration  Provided to: Patient  Level of Understanding: Teach back education performed, Verbalized, Demonstrated              Time Calculation:   Start Time: 0849  Stop Time: 0955  Time Calculation (min): 66 min  PT Non-Billable Time (min): 15 min  Total Timed Code Minutes- PT: 51 minute(s)  Therapy Charges for Today     Code Description Service Date Service Provider Modifiers Qty    64418735371 HC PT THER PROC EA 15 MIN 1/15/2020 Johnathan Herrera, PT GP 3    47307099950 HC PT THER SUPP EA 15 MIN 1/15/2020 Johnathan Herrera, PT GP 1                    Johnathan Herrera, PT  1/15/2020

## 2020-01-20 ENCOUNTER — HOSPITAL ENCOUNTER (OUTPATIENT)
Dept: PHYSICAL THERAPY | Facility: HOSPITAL | Age: 70
Setting detail: THERAPIES SERIES
Discharge: HOME | End: 2020-01-20

## 2020-01-20 DIAGNOSIS — M70.72 BURSITIS OF LEFT HIP, UNSPECIFIED BURSA: Primary | ICD-10-CM

## 2020-01-20 DIAGNOSIS — M25.512 ACUTE PAIN OF LEFT SHOULDER: ICD-10-CM

## 2020-01-20 PROCEDURE — 97035 APP MDLTY 1+ULTRASOUND EA 15: CPT

## 2020-01-20 PROCEDURE — 97110 THERAPEUTIC EXERCISES: CPT

## 2020-01-20 NOTE — THERAPY TREATMENT NOTE
Outpatient Physical Therapy Ortho Treatment Note  [facility]     Patient Name: Kathleen Joseph  : 1950  MRN: [id number]  Today's Date: 2020      Visit Date: 2020  Subjective Improvement: none   Visits Attended:    Visits Approved Medicare 80%   MD visit:    Recert Date: 2020       Visit Dx:    ICD-10-CM ICD-9-CM   1. Bursitis of left hip, unspecified bursa M70.72 726.5   2. Acute pain of left shoulder M25.512 719.41       There is no problem list on file for this patient.       Past Medical History:   Diagnosis Date   • Elevated cholesterol    • GERD (gastroesophageal reflux disease)    • Hypertension         Past Surgical History:   Procedure Laterality Date   • BACK SURGERY     • BREAST SURGERY      2 benign breast lumps removed   • COLONOSCOPY N/A 3/26/2018    Procedure: COLONOSCOPY;  Surgeon: Humza Davis DO;  Location: [facility] ENDOSCOPY;  Service: Gastroenterology   • ENDOSCOPY N/A 3/26/2018    Procedure: ESOPHAGOGASTRODUODENOSCOPY;  Surgeon: Humza Davis DO;  Location: [facility] ENDOSCOPY;  Service: Gastroenterology   • LUMBAR DISCECTOMY     • TONSILLECTOMY     • TUBAL ABDOMINAL LIGATION         PT Ortho     Row Name 20 0900       Subjective Comments    Subjective Comments  Pt reports that clams hurts worse. Pt reports pain intensifies in the evening or lay on side.  -TL       Precautions and Contraindications    Precautions/Limitations  no known precautions/limitations  -TL       Subjective Pain    Able to rate subjective pain?  yes  -TL    Pre-Treatment Pain Level  4  -TL      User Key  (r) = Recorded By, (t) = Taken By, (c) = Cosigned By    Initials Name Provider Type    Yazmin Farah, PTA Physical Therapy Assistant                      PT Assessment/Plan     Row Name 20 09          PT Assessment    Assessment Comments  No adverse reaction to US. No change in pain with US. Pt tolerated new stretch of hip flexor stretch this date. Pt  tolerated new stretches well of hip flexors and ITB S this date. Pt has not improved overall. PTA spoke with therapist concerning skin cancer a year ago. Therapist gave the okay to continue with US. Pt left with no pain this date after ice. Pt does not feel much improvement with overall pain.No new goals met at this time.  -TL        PT Plan    PT Frequency  2x/week  -TL     Predicted Duration of Therapy Intervention (Therapy Eval)  3 weeks  -TL     PT Plan Comments  continue with US . Ask pt if she is back in her bed. Pt was going to try back to sleeping in bed last visit.  -TL       User Key  (r) = Recorded By, (t) = Taken By, (c) = Cosigned By    Initials Name Provider Type    Yazmin Farah PTA Physical Therapy Assistant          Modalities     Row Name 01/20/20 0900             Subjective Pain    Post-Treatment Pain Level  0  -TL         Ice    Location  left hip  -TL      Rx Minutes  15 mins  -TL      Ice S/P Rx  Yes  -TL      Patient reports will apply ice at home to involved area  Yes  -TL         Ultrasound [id number]    Location  left hip  -TL      Duty Cycle  100  -TL      Frequency  3.0 MHz  -TL      Intensity - Wts/cm  1.5  -TL        User Key  (r) = Recorded By, (t) = Taken By, (c) = Cosigned By    Initials Name Provider Type    Yazmin Farah PTA Physical Therapy Assistant        OP Exercises     Row Name 01/20/20 0900             Subjective Comments    Subjective Comments  Pt reports that clams hurts worse. Pt reports pain intensifies in the evening or lay on side.  -TL         Subjective Pain    Able to rate subjective pain?  yes  -TL      Pre-Treatment Pain Level  4  -TL      Post-Treatment Pain Level  0  -TL         Exercise 1    Exercise Name 1  incline s  -TL      Reps 1  2  -TL      Time 1  30 sec hold  -TL         Exercise 2    Exercise Name 2  st ham S  -TL      Reps 2  2  -TL      Time 2  30 sec hold  -TL         Exercise 3    Exercise Name 3  Hip flex S  -TL      Reps 3  2  -TL       Time 3  1 min on left  -TL         Exercise 4    Exercise Name 4  ITB S  -TL         Exercise 5    Exercise Name 5  Bridging with ball squeezes  -TL      Reps 5  20  -TL      Time 5  5 sec hold  -TL         Exercise 6    Exercise Name 6  LTR  -TL      Sets 6  1  -TL      Reps 6  10  -TL      Time 6  10 sec hold  -TL         Exercise 7    Exercise Name 7  LUIS  -TL        User Key  (r) = Recorded By, (t) = Taken By, (c) = Cosigned By    Initials Name Provider Type    Yazmin Farah PTA Physical Therapy Assistant                       PT OP Goals     Row Name 01/20/20 0840          PT Short Term Goals    STG Date to Achieve  01/29/20  -TL     STG 1  Pt indep with HEP  -TL     STG 1 Progress  Ongoing;Met  -TL     STG 2  Improve L hip flex/abduction MMT to 4+/5  -TL     STG 2 Progress  Ongoing;Progressing  -TL     STG 3  Improve L hip flex/abd AROM to 90 deg/20 deg respectively  -TL     STG 3 Progress  Ongoing  -TL     STG 4  Reduce L hip pain by 25-50% with sleeping and prolonged sitting  -TL     STG 4 Progress  Ongoing  -TL     STG 5  Able to resume sleeping back in her bed at night  -TL     STG 5 Progress  Ongoing;Progressing  -TL        Time Calculation    PT Goal Re-Cert Due Date  01/29/20  -TL       User Key  (r) = Recorded By, (t) = Taken By, (c) = Cosigned By    Initials Name Provider Type    Yazmin Farah PTA Physical Therapy Assistant          Therapy Education  Education Details: ITB S and hip flexor S  Given: HEP, Symptoms/condition management, Pain management, Posture/body mechanics  Program: Reinforced  How Provided: Verbal, Demonstration, Written  Provided to: Patient  Level of Understanding: Teach back education performed, Verbalized, Demonstrated              Time Calculation:   Start Time: 0840  Stop Time: 0947  Time Calculation (min): 67 min  PT Non-Billable Time (min): 15 min  Total Timed Code Minutes- PT: 52 minute(s)  Therapy Charges for Today     Code Description Service Date Service  Provider Modifiers Qty    31024048136 HC PT ULTRASOUND EA 15 MIN 1/20/2020 Yazmin Sheth, PTA GP 1    66565504469 HC PT THER PROC EA 15 MIN 1/20/2020 Yazmin Sheth, PTA GP 2                    Yazmin Sheth, PTA  1/20/2020

## 2020-01-22 ENCOUNTER — HOSPITAL ENCOUNTER (OUTPATIENT)
Dept: PHYSICAL THERAPY | Facility: HOSPITAL | Age: 70
Setting detail: THERAPIES SERIES
Discharge: HOME | End: 2020-01-22

## 2020-01-22 DIAGNOSIS — M70.72 BURSITIS OF LEFT HIP, UNSPECIFIED BURSA: Primary | ICD-10-CM

## 2020-01-22 PROCEDURE — 97110 THERAPEUTIC EXERCISES: CPT

## 2020-01-22 PROCEDURE — 97035 APP MDLTY 1+ULTRASOUND EA 15: CPT

## 2020-01-22 NOTE — THERAPY TREATMENT NOTE
Outpatient Physical Therapy Ortho Treatment Note  [facility]     Patient Name: Kathleen Joseph  : 1950  MRN: [id number]  Today's Date: 2020      Visit Date: 2020  Subjective Improvement: 50%   Visits Attended: /   Visits Approved Medicare 80%   MD visit:    Recert Date: 2020       Visit Dx:    ICD-10-CM ICD-9-CM   1. Bursitis of left hip, unspecified bursa M70.72 726.5       There is no problem list on file for this patient.       Past Medical History:   Diagnosis Date   • Elevated cholesterol    • GERD (gastroesophageal reflux disease)    • Hypertension         Past Surgical History:   Procedure Laterality Date   • BACK SURGERY     • BREAST SURGERY      2 benign breast lumps removed   • COLONOSCOPY N/A 3/26/2018    Procedure: COLONOSCOPY;  Surgeon: Humza Davis DO;  Location: [facility] ENDOSCOPY;  Service: Gastroenterology   • ENDOSCOPY N/A 3/26/2018    Procedure: ESOPHAGOGASTRODUODENOSCOPY;  Surgeon: Humza Davis DO;  Location: [facility] ENDOSCOPY;  Service: Gastroenterology   • LUMBAR DISCECTOMY     • TONSILLECTOMY     • TUBAL ABDOMINAL LIGATION         PT Ortho     Row Name 20 0800       Subjective Comments    Subjective Comments  Pt reports 50% improvement.   Pt reports that she is still sleeping in recline 1/2 night. Pt has tried a full night but still tosses and turns.  -TL       Precautions and Contraindications    Precautions/Limitations  no known precautions/limitations  -TL       Subjective Pain    Able to rate subjective pain?  yes  -TL    Pre-Treatment Pain Level  3  -TL      User Key  (r) = Recorded By, (t) = Taken By, (c) = Cosigned By    Initials Name Provider Type    Yazmin Farah PTA Physical Therapy Assistant                      PT Assessment/Plan     Row Name 20 1000          PT Assessment    Assessment Comments  No adverse reaction to US. pt reports 50% improvement. short term 1 and 4 met. Pt working toward goals.  -TL        PT  Plan    PT Frequency  2x/week  -TL     Predicted Duration of Therapy Intervention (Therapy Eval)  3 weeks  -TL     PT Plan Comments  add prone heelsqueezes next visit.  -TL       User Key  (r) = Recorded By, (t) = Taken By, (c) = Cosigned By    Initials Name Provider Type    Yazmin Farah PTA Physical Therapy Assistant          Modalities     Row Name 01/22/20 0845 01/22/20 0800          Subjective Pain    Post-Treatment Pain Level  --  0  -TL        Ice    Ice Applied  Yes  -TL  --     Location  left hip  -TL  --     Rx Minutes  15 mins  -TL  --     Ice S/P Rx  Yes  -TL  --     Patient reports will apply ice at home to involved area  Yes  -TL  --        Ultrasound 20302    Location  left hip  -TL  --     Duty Cycle  100  -TL  --     Frequency  3.0 MHz  -TL  --     Intensity - Wts/cm  1.5  -TL  --       User Key  (r) = Recorded By, (t) = Taken By, (c) = Cosigned By    Initials Name Provider Type    Yazmin Farah PTA Physical Therapy Assistant        OP Exercises     Row Name 01/22/20 0800             Subjective Comments    Subjective Comments  Pt reports 50% improvement.   Pt reports that she is still sleeping in recline 1/2 night. Pt has tried a full night but still tosses and turns.  -TL         Subjective Pain    Able to rate subjective pain?  yes  -TL      Pre-Treatment Pain Level  3  -TL      Post-Treatment Pain Level  0  -TL         Exercise 1    Exercise Name 1  Pro ll Legs level 3  -TL      Time 1  10 mins  -TL         Exercise 2    Exercise Name 2  incline S  -TL      Reps 2  2  -TL      Time 2  30 sec hold  -TL         Exercise 3    Exercise Name 3  supine ham S  -TL      Reps 3  2  -TL      Time 3  30 sec hold  -TL         Exercise 4    Exercise Name 4  LTR  -TL      Sets 4  1  -TL      Reps 4  10  -TL      Time 4  30 sec hold  -TL         Exercise 5    Exercise Name 5  supine pirif S  -TL      Reps 5  2  -TL      Time 5  30  -TL         Exercise 6    Exercise Name 6  bridging with hip abd  with RTB  -TL      Sets 6  1  -TL      Reps 6  10  -TL      Time 6  5 sec hold  -TL         Exercise 7    Exercise Name 7  ppu  -TL      Reps 7  5  -TL      Time 7  10 sec hold  -TL        User Key  (r) = Recorded By, (t) = Taken By, (c) = Cosigned By    Initials Name Provider Type    TL Yazmin Sheth PTA Physical Therapy Assistant                       PT OP Goals     Row Name 01/22/20 0845          PT Short Term Goals    STG Date to Achieve  01/29/20  -TL     STG 1  Pt indep with HEP  -TL     STG 1 Progress  Ongoing;Met  -TL     STG 2  Improve L hip flex/abduction MMT to 4+/5  -TL     STG 2 Progress  Ongoing;Progressing  -TL     STG 3  Improve L hip flex/abd AROM to 90 deg/20 deg respectively  -TL     STG 3 Progress  Ongoing  -TL     STG 4  Reduce L hip pain by 25-50% with sleeping and prolonged sitting  -TL     STG 4 Progress  Met  -TL     STG 5  Able to resume sleeping back in her bed at night  -TL     STG 5 Progress  Ongoing;Progressing  -TL        Time Calculation    PT Goal Re-Cert Due Date  01/29/20  -TL       User Key  (r) = Recorded By, (t) = Taken By, (c) = Cosigned By    Initials Name Provider Type    TL Yazmin Sheth PTA Physical Therapy Assistant          Therapy Education  Education Details: ppu  Given: HEP, Symptoms/condition management, Pain management  Program: New, Reinforced  How Provided: Verbal, Demonstration, Written  Provided to: Patient  Level of Understanding: Teach back education performed, Verbalized, Demonstrated              Time Calculation:   Start Time: 0845  Stop Time: 0947  Time Calculation (min): 62 min  PT Non-Billable Time (min): 15 min  Total Timed Code Minutes- PT: 47 minute(s)  Therapy Charges for Today     Code Description Service Date Service Provider Modifiers Qty    81816629798 HC PT THER PROC EA 15 MIN 1/22/2020 Yazmin Sheth PTA GP 2    [id number] HC PT ULTRASOUND EA 15 MIN 1/22/2020 Yazmin Sheth PTA GP 1                    Yazmin Sheth  PTA  1/22/2020

## 2020-01-27 ENCOUNTER — HOSPITAL ENCOUNTER (OUTPATIENT)
Dept: PHYSICAL THERAPY | Facility: HOSPITAL | Age: 70
Setting detail: THERAPIES SERIES
Discharge: HOME | End: 2020-01-27

## 2020-01-27 DIAGNOSIS — M70.72 BURSITIS OF LEFT HIP, UNSPECIFIED BURSA: Primary | ICD-10-CM

## 2020-01-27 PROCEDURE — 97110 THERAPEUTIC EXERCISES: CPT

## 2020-01-27 PROCEDURE — 97035 APP MDLTY 1+ULTRASOUND EA 15: CPT

## 2020-01-27 NOTE — THERAPY TREATMENT NOTE
Outpatient Physical Therapy Ortho Treatment Note  [facility]     Patient Name: Kathleen Joseph  : 1950  MRN: [id number]  Today's Date: 2020      Visit Date: 2020  Subjective Improvement: 50%   Visits Attended:    Visits Approved Medicare 80%   MD visit:    Recert Date: 2020       Visit Dx:    ICD-10-CM ICD-9-CM   1. Bursitis of left hip, unspecified bursa M70.72 726.5       There is no problem list on file for this patient.       Past Medical History:   Diagnosis Date   • Elevated cholesterol    • GERD (gastroesophageal reflux disease)    • Hypertension         Past Surgical History:   Procedure Laterality Date   • BACK SURGERY     • BREAST SURGERY      2 benign breast lumps removed   • COLONOSCOPY N/A 3/26/2018    Procedure: COLONOSCOPY;  Surgeon: Humza Davis DO;  Location: [facility] ENDOSCOPY;  Service: Gastroenterology   • ENDOSCOPY N/A 3/26/2018    Procedure: ESOPHAGOGASTRODUODENOSCOPY;  Surgeon: Humza Davis DO;  Location: [facility] ENDOSCOPY;  Service: Gastroenterology   • LUMBAR DISCECTOMY     • TONSILLECTOMY     • TUBAL ABDOMINAL LIGATION         PT Ortho     Row Name 20 0800       Subjective Comments    Subjective Comments  Pt remains 50% improved.  -TL       Precautions and Contraindications    Precautions/Limitations  no known precautions/limitations  -TL       Subjective Pain    Able to rate subjective pain?  yes  -TL    Pre-Treatment Pain Level  3  -TL      User Key  (r) = Recorded By, (t) = Taken By, (c) = Cosigned By    Initials Name Provider Type    TL Yazmin Sheth PTA Physical Therapy Assistant                      PT Assessment/Plan     Row Name 20 0900          PT Assessment    Assessment Comments  no adverse reaction to US. Pt reports still 50% improved. Pt staying around 2-3. Pt reports that she has been doing HEP. Pt still sleeping in recliner. No new goals met. Pt sees therapist next visit. Pt tolerated sl hip abd, prone SLr,  st hip abd  this date.no new goals met at this time  -TL        PT Plan    PT Frequency  2x/week  -TL     Predicted Duration of Therapy Intervention (Therapy Eval)  3 weeks  -TL     PT Plan Comments  recert next visit  -TL       User Key  (r) = Recorded By, (t) = Taken By, (c) = Cosigned By    Initials Name Provider Type    TL Yazmin Sheth PTA Physical Therapy Assistant          Modalities     Row Name 01/27/20 0800             Ice    Ice Applied  Yes  -TL      Location  left hip  -TL      Rx Minutes  15 mins  -TL      Ice S/P Rx  Yes  -TL      Patient reports will apply ice at home to involved area  Yes  -TL         Ultrasound 44071    Location  left hip  -TL      Duty Cycle  100  -TL      Frequency  3.0 MHz  -TL      Intensity - Wts/cm  1.5  -TL        User Key  (r) = Recorded By, (t) = Taken By, (c) = Cosigned By    Initials Name Provider Type    TL Yazmin Sheth PTA Physical Therapy Assistant        OP Exercises     Row Name 01/27/20 0800             Subjective Comments    Subjective Comments  Pt remains 50% improved.  -TL         Subjective Pain    Able to rate subjective pain?  yes  -TL      Pre-Treatment Pain Level  3  -TL         Exercise 1    Exercise Name 1  ITB S on left  -TL      Reps 1  2  -TL      Time 1  30 sec hold  -TL         Exercise 2    Exercise Name 2  St Ham S  -TL      Reps 2  2  -TL      Time 2  30 sec hold  -TL         Exercise 3    Exercise Name 3  LTR  -TL      Sets 3  1  -TL      Reps 3  10  -TL      Time 3  10 sec hold  -TL         Exercise 4    Exercise Name 4  bridging with hip abd using RTB  -TL      Reps 4  20  -TL      Time 4  5 sec hold  -TL         Exercise 5    Exercise Name 5  Prone heel squeezes  -TL      Sets 5  2  -TL      Reps 5  10  -TL      Time 5  5 sec hold  -TL         Exercise 6    Exercise Name 6  prone SLR  -TL      Sets 6  2  -TL      Reps 6  10  -TL      Time 6  sec hold  -TL         Exercise 7    Exercise Name 7  SL hip abd left  -TL      Sets 7  2  -TL       Reps 7  10  -TL        User Key  (r) = Recorded By, (t) = Taken By, (c) = Cosigned By    Initials Name Provider Type    Yazmin Farah PTA Physical Therapy Assistant                       PT OP Goals     Row Name 01/27/20 0800          PT Short Term Goals    STG Date to Achieve  01/29/20  -TL     STG 1  Pt indep with HEP  -TL     STG 1 Progress  Ongoing;Met  -TL     STG 2  Improve L hip flex/abduction MMT to 4+/5  -TL     STG 2 Progress  Ongoing;Progressing  -TL     STG 3  Improve L hip flex/abd AROM to 90 deg/20 deg respectively  -TL     STG 3 Progress  Ongoing  -TL     STG 4  Reduce L hip pain by 25-50% with sleeping and prolonged sitting  -TL     STG 4 Progress  Met  -TL     STG 5  Able to resume sleeping back in her bed at night  -TL     STG 5 Progress  Ongoing;Progressing  -TL        Time Calculation    PT Goal Re-Cert Due Date  01/29/20  -TL       User Key  (r) = Recorded By, (t) = Taken By, (c) = Cosigned By    Initials Name Provider Type    Yazmin Farah PTA Physical Therapy Assistant          Therapy Education  Education Details: st hip abd, supine pirif s, bridging with hip abd RTB, SL SLR hip abd,ITB s  Given: Symptoms/condition management, HEP, Pain management, Posture/body mechanics  Program: New, Reinforced  How Provided: Verbal, Demonstration, Written  Provided to: Patient  Level of Understanding: Teach back education performed, Verbalized, Demonstrated              Time Calculation:   Start Time: 0841  Stop Time: 0946  Time Calculation (min): 65 min  PT Non-Billable Time (min): 15 min  Total Timed Code Minutes- PT: 45 minute(s)  Therapy Charges for Today     Code Description Service Date Service Provider Modifiers Qty    53157690497 HC PT THER PROC EA 15 MIN 1/27/2020 Yazmin Sheth PTA GP 2    81274597538 HC PT ULTRASOUND EA 15 MIN 1/27/2020 Yazmin Sheth PTA GP 1                    Yazmin Sheth PTA  1/27/2020

## 2020-01-29 ENCOUNTER — HOSPITAL ENCOUNTER (OUTPATIENT)
Dept: PHYSICAL THERAPY | Facility: HOSPITAL | Age: 70
Setting detail: THERAPIES SERIES
Discharge: HOME | End: 2020-01-29

## 2020-01-29 DIAGNOSIS — M70.72 BURSITIS OF LEFT HIP, UNSPECIFIED BURSA: Primary | ICD-10-CM

## 2020-01-29 PROCEDURE — 97110 THERAPEUTIC EXERCISES: CPT | Performed by: PHYSICAL THERAPIST

## 2020-01-29 NOTE — THERAPY PROGRESS REPORT/RE-CERT
Outpatient Physical Therapy Ortho Progress Note  [facility]     Patient Name: Kathleen Joseph  : 1950  MRN: [id number]  Today's Date: 2020      Visit Date: 2020  Subjective Improvement: 50%   Visits Attended:    Visits Approved Medicare 80%   MD visit:    Recert Date: 2020       There is no problem list on file for this patient.       Past Medical History:   Diagnosis Date   • Elevated cholesterol    • GERD (gastroesophageal reflux disease)    • Hypertension         Past Surgical History:   Procedure Laterality Date   • BACK SURGERY     • BREAST SURGERY      2 benign breast lumps removed   • COLONOSCOPY N/A 3/26/2018    Procedure: COLONOSCOPY;  Surgeon: Humza Davis DO;  Location: [facility] ENDOSCOPY;  Service: Gastroenterology   • ENDOSCOPY N/A 3/26/2018    Procedure: ESOPHAGOGASTRODUODENOSCOPY;  Surgeon: Humza Davis DO;  Location: [facility] ENDOSCOPY;  Service: Gastroenterology   • LUMBAR DISCECTOMY     • TONSILLECTOMY     • TUBAL ABDOMINAL LIGATION         Visit Dx:     ICD-10-CM ICD-9-CM   1. Bursitis of left hip, unspecified bursa M70.72 726.5             PT Ortho     Row Name 20       Precautions and Contraindications    Precautions/Limitations  no known precautions/limitations  -BS       General ROM    GENERAL ROM COMMENTS  L hip Flex 104, L Hip Abd 18  -BS       MMT (Manual Muscle Testing)    General MMT Comments  MMT: L Hip Flex 4+/5, L Hip Abd 4+/5  -BS      User Key  (r) = Recorded By, (t) = Taken By, (c) = Cosigned By    Initials Name Provider Type    Johnathan Diaz, PT Physical Therapist                      Therapy Education  Education Details: Stephen KWOK  Given: HEP  Program: Reinforced, Progressed  How Provided: Demonstration  Provided to: Patient  Level of Understanding: Demonstrated     PT OP Goals     Row Name 20 09          PT Short Term Goals    STG Date to Achieve  20  -BS     STG 1  Pt indep with HEP  -BS     STG 1  Progress  Ongoing;Met  -BS     STG 2  Improve L hip flex/abduction MMT to 4+/5  -BS     STG 2 Progress  Ongoing;Progressing  -BS     STG 3  Improve L hip flex/abd AROM to 90 deg/20 deg respectively  -BS     STG 3 Progress  Ongoing  -BS     STG 4  Reduce L hip pain by 25-50% with sleeping and prolonged sitting  -BS     STG 4 Progress  Met  -BS     STG 5  Able to resume sleeping back in her bed at night  -BS     STG 5 Progress  Ongoing;Progressing  -BS        Time Calculation    PT Goal Re-Cert Due Date  02/19/20  -BS       User Key  (r) = Recorded By, (t) = Taken By, (c) = Cosigned By    Initials Name Provider Type    Johnathan Diaz, PT Physical Therapist          PT Assessment/Plan     Row Name 01/29/20 0900          PT Assessment    Functional Limitations  Performance in work activities;Performance in self-care ADL;Performance in sport activities;Performance in leisure activities;Limitation in home management;Impaired gait  -BS     Impairments  Impaired flexibility;Gait;Muscle strength;Pain;Posture;Range of motion  -BS     Assessment Comments  Improving toward goals. Hip Int Rotators are very tight. Improved MMT/ROM overall with the L hip.    -BS     Please refer to paper survey for additional self-reported information  Yes  -BS     Rehab Potential  Good  -BS     Patient/caregiver participated in establishment of treatment plan and goals  Yes  -BS     Patient would benefit from skilled therapy intervention  Yes  -BS        PT Plan    PT Frequency  2x/week  -BS     Predicted Duration of Therapy Intervention (Therapy Eval)  2-3 weeks  -BS     PT Plan Comments  will continue through next week per pt request. Continue addressing hip ER tightness (piriformis) as well as focus on flexion stress/stretching to R hip. Fwd lunge S  -BS       User Key  (r) = Recorded By, (t) = Taken By, (c) = Cosigned By    Initials Name Provider Type    Johnathan Diaz, PT Physical Therapist          Modalities     Row Name 01/29/20  0900             Ice    Ice Applied  Yes  -BS      Location  left hip  -BS      Rx Minutes  15 mins  -BS      Ice S/P Rx  Yes  -BS      Patient reports will apply ice at home to involved area  Yes  -BS        User Key  (r) = Recorded By, (t) = Taken By, (c) = Cosigned By    Initials Name Provider Type    Johnathan Diaz PT Physical Therapist        OP Exercises     Row Name 01/29/20 0900             Subjective Comments    Subjective Comments  Can sleep in bed for about 1 1/2 hours before pain is too great to   -BS         Subjective Pain    Able to rate subjective pain?  yes  -BS      Pre-Treatment Pain Level  -- 2-3/10  -BS      Post-Treatment Pain Level  2  -BS         Total Minutes    03483 - PT Therapeutic Exercise Minutes  52  -BS         Exercise 1    Exercise Name 1  ITB S on left  -BS      Reps 1  2  -BS      Time 1  30 sec hold  -BS         Exercise 2    Exercise Name 2  St Ham S  -BS      Reps 2  2  -BS      Time 2  30 sec hold  -BS         Exercise 3    Exercise Name 3  DKC  -BS      Reps 3  10  -BS         Exercise 4    Exercise Name 4  L SL Hip Abd  -BS      Reps 4  10  -BS         Exercise 5    Exercise Name 5  Prone on Elbows  -BS      Time 5  1 min  -BS         Exercise 6    Exercise Name 6  Prone Press Up  -BS      Reps 6  10  -BS         Exercise 7    Exercise Name 7  Seated Piri S  -BS      Reps 7  10  -BS      Additional Comments  L is very tight  -BS         Exercise 8    Exercise Name 8  Repeated Flexion in Seated  -BS      Reps 8  10  -BS         Exercise 9    Exercise Name 9  SKC  -BS      Reps 9  3  -BS      Time 9  30 sec  -BS         Exercise 10    Exercise Name 10  Bridge w/ Add  -BS      Reps 10  10  -BS      Time 10  5 sec  -BS         Exercise 11    Exercise Name 11  SKC L only  -BS      Reps 11  15  -BS         Exercise 12    Exercise Name 12  Ice  -BS      Time 12  15 min  -BS        User Key  (r) = Recorded By, (t) = Taken By, (c) = Cosigned By    Initials Name Provider Type     BS Johnathan Herrera, PT Physical Therapist                        Outcome Measure Options: Lower Extremity Functional Scale (LEFS)  Lower Extremity Functional Index  Any of your usual work, housework or school activities: A little bit of difficulty  Your usual hobbies, recreational or sporting activities: Moderate difficulty  Getting into or out of the bath: A little bit of difficulty  Walking between rooms: No difficulty  Putting on your shoes or socks: Moderate difficulty  Squatting: Moderate difficulty  Lifting an object, like a bag of groceries from the floor: A little bit of difficulty  Performing light activities around your home: A little bit of difficulty  Performing heavy activities around your home: Moderate difficulty  Getting into or out of a car: Moderate difficulty  Walking 2 blocks: Moderate difficulty  Walking a mile: Quite a bit of difficulty  Going up or down 10 stairs (about 1 flight of stairs): Quite a bit of difficulty  Standing for 1 hour: Moderate difficulty  Sitting for 1 hour: Quite a bit of difficulty  Running on even ground: Extreme difficulty or unable to perform activity  Running on uneven ground: Extreme difficulty or unable to perform activity  Making sharp turns while running fast: Extreme difficulty or unable to perform activity  Hopping: Moderate difficulty  Rolling over in bed: Quite a bit of difficulty  Total: 36      Time Calculation:     Start Time: 0843  Stop Time: 0950  Time Calculation (min): 67 min  PT Non-Billable Time (min): 15 min  Total Timed Code Minutes- PT: 52 minute(s)     Therapy Charges for Today     Code Description Service Date Service Provider Modifiers Qty    00661058162 HC PT THER PROC EA 15 MIN 1/29/2020 Johnathan Herrera, PT GP 3    12394787216 HC PT THER SUPP EA 15 MIN 1/29/2020 Johnathan Herrera, PT GP 1          PT G-Codes  Outcome Measure Options: Lower Extremity Functional Scale (LEFS)  Total: 36         Johnathan Herrera PT  1/29/2020

## 2020-02-03 ENCOUNTER — HOSPITAL ENCOUNTER (OUTPATIENT)
Dept: PHYSICAL THERAPY | Facility: HOSPITAL | Age: 70
Setting detail: THERAPIES SERIES
Discharge: HOME | End: 2020-02-03

## 2020-02-03 DIAGNOSIS — M70.72 BURSITIS OF LEFT HIP, UNSPECIFIED BURSA: Primary | ICD-10-CM

## 2020-02-03 PROCEDURE — 97110 THERAPEUTIC EXERCISES: CPT

## 2020-02-03 NOTE — THERAPY TREATMENT NOTE
Outpatient Physical Therapy Ortho Treatment Note  [facility]     Patient Name: Kathleen Joseph  : 1950  MRN: [id number]  Today's Date: 2/3/2020      Visit Date: 2020  Subjective Improvement: 50%   Visits Attended:    Visits Approved Medicare 80%   MD visit:    Recert Date: 2020       Visit Dx:    ICD-10-CM ICD-9-CM   1. Bursitis of left hip, unspecified bursa M70.72 726.5       There is no problem list on file for this patient.       Past Medical History:   Diagnosis Date   • Elevated cholesterol    • GERD (gastroesophageal reflux disease)    • Hypertension         Past Surgical History:   Procedure Laterality Date   • BACK SURGERY     • BREAST SURGERY      2 benign breast lumps removed   • COLONOSCOPY N/A 3/26/2018    Procedure: COLONOSCOPY;  Surgeon: Humza Davis DO;  Location: [facility] ENDOSCOPY;  Service: Gastroenterology   • ENDOSCOPY N/A 3/26/2018    Procedure: ESOPHAGOGASTRODUODENOSCOPY;  Surgeon: Humza Davis DO;  Location: [facility] ENDOSCOPY;  Service: Gastroenterology   • LUMBAR DISCECTOMY     • TONSILLECTOMY     • TUBAL ABDOMINAL LIGATION         PT Ortho     Row Name 20 0800       Subjective Comments    Subjective Comments  pt goes to the doctor tomorrow.  -TL       Precautions and Contraindications    Precautions/Limitations  no known precautions/limitations  -TL       Subjective Pain    Able to rate subjective pain?  yes  -TL    Pre-Treatment Pain Level  2  -TL      User Key  (r) = Recorded By, (t) = Taken By, (c) = Cosigned By    Initials Name Provider Type    TL Yazmin Sheth PTA Physical Therapy Assistant                      PT Assessment/Plan     Row Name 20 0900          PT Assessment    Assessment Comments  Pt has met 1,2 and 4 short term goals. Pt progressing toward other goals. PTA spoke with therapist concerning aquatic therapy. PT agrees to try starting next week x2 for 2 weeks aquatics. pt is is agreeable.  KRISTINA wrote a M.D  progress note for appt  -TL        PT Plan    PT Frequency  2x/week  -TL     Predicted Duration of Therapy Intervention (Therapy Eval)  2-3 weeks  -TL     PT Plan Comments  start aquatic next week  -TL       User Key  (r) = Recorded By, (t) = Taken By, (c) = Cosigned By    Initials Name Provider Type    Yazmin Farah PTA Physical Therapy Assistant          Modalities     Row Name 02/03/20 0800             Ice    Ice Applied  Yes  -TL      Location  left hip  -TL      Rx Minutes  15 mins  -TL      Ice S/P Rx  Yes  -TL      Patient reports will apply ice at home to involved area  Yes  -TL        User Key  (r) = Recorded By, (t) = Taken By, (c) = Cosigned By    Initials Name Provider Type    Yazmin Farah PTA Physical Therapy Assistant        OP Exercises     Row Name 02/03/20 0800             Subjective Comments    Subjective Comments  pt goes to the doctor tomorrow.  -TL         Subjective Pain    Able to rate subjective pain?  yes  -TL      Pre-Treatment Pain Level  2  -TL         Exercise 1    Exercise Name 1  pro LL level 5  -TL      Time 1  10 mins  -TL         Exercise 2    Exercise Name 2  St ham S  -TL      Reps 2  2  -TL      Time 2  30 sec hold  -TL         Exercise 3    Exercise Name 3  Lunge S  -TL      Reps 3  10  -TL      Time 3  10 sec L  -TL         Exercise 4    Exercise Name 4  st hip abd   -TL      Reps 4  20  -TL         Exercise 5    Exercise Name 5  ITB S on left  -TL      Reps 5  2  -TL      Time 5  30 sec hold  -TL         Exercise 6    Exercise Name 6  Modified pirif S (seated hip abd with ER)  -TL      Reps 6  3  -TL      Time 6  30 sec hold  -TL         Exercise 7    Exercise Name 7  sitting pirif S  -TL      Reps 7  2  -TL      Time 7  30 sec hold  -TL         Exercise 8    Exercise Name 8  clams L  -TL      Sets 8  2  -TL      Reps 8  15  -TL      Time 8  5 sec hold  -TL         Exercise 9    Exercise Name 9  SL hip L  -TL      Sets 9  2  -TL      Reps 9  10  -TL          Exercise 10    Exercise Name 10  prone alternating SLR  -TL         Exercise 11    Exercise Name 11  Bridge with abd  -TL      Sets 11  2  -TL      Reps 11  10  -TL      Time 11  5 sec hold  -TL         Exercise 12    Exercise Name 12  ice  -TL        User Key  (r) = Recorded By, (t) = Taken By, (c) = Cosigned By    Initials Name Provider Type    Yazmin Farah PTA Physical Therapy Assistant                       PT OP Goals     Row Name 02/03/20 0800          PT Short Term Goals    STG Date to Achieve  01/29/20  -TL     STG 1  Pt indep with HEP  -TL     STG 1 Progress  Ongoing;Met  -TL     STG 2  Improve L hip flex/abduction MMT to 4+/5  -TL     STG 2 Progress  Met  -TL     STG 3  Improve L hip flex/abd AROM to 90 deg/20 deg respectively  -TL     STG 3 Progress  Ongoing;Progressing  -TL     STG 4  Reduce L hip pain by 25-50% with sleeping and prolonged sitting  -TL     STG 4 Progress  Met  -TL     STG 5  Able to resume sleeping back in her bed at night  -TL     STG 5 Progress  Ongoing;Progressing  -TL       User Key  (r) = Recorded By, (t) = Taken By, (c) = Cosigned By    Initials Name Provider Type    Yazmin Farah PTA Physical Therapy Assistant          Therapy Education  Education Details: encouage pt to continue with seated Pirif  s  Given: HEP, Symptoms/condition management, Pain management, Posture/body mechanics  Program: Reinforced  How Provided: Verbal, Demonstration, Written  Provided to: Patient  Level of Understanding: Teach back education performed, Verbalized, Demonstrated              Time Calculation:   Start Time: 0840  Stop Time: 0947  Time Calculation (min): 67 min  PT Non-Billable Time (min): 15 min  Total Timed Code Minutes- PT: 52 minute(s)  Therapy Charges for Today     Code Description Service Date Service Provider Modifiers Qty    86791510501 HC PT THER PROC EA 15 MIN 2/3/2020 Yazmin Sheth PTA GP 3                    Yazmin Sheth PTA  2/3/2020

## 2020-02-05 ENCOUNTER — HOSPITAL ENCOUNTER (OUTPATIENT)
Dept: PHYSICAL THERAPY | Facility: HOSPITAL | Age: 70
Setting detail: THERAPIES SERIES
Discharge: HOME | End: 2020-02-05

## 2020-02-05 DIAGNOSIS — M70.72 BURSITIS OF LEFT HIP, UNSPECIFIED BURSA: Primary | ICD-10-CM

## 2020-02-05 PROCEDURE — 97110 THERAPEUTIC EXERCISES: CPT

## 2020-02-05 NOTE — THERAPY TREATMENT NOTE
Outpatient Physical Therapy Ortho Treatment Note  [facility]     Patient Name: Kathleen Joseph  : 1950  MRN: [id number]  Today's Date: 2020      Visit Date: 2020  Subjective Improvement: 50%   Visits Attended:    Visits Approved Medicare 80%   MD visit: TBD   Recert Date: 2020       Visit Dx:    ICD-10-CM ICD-9-CM   1. Bursitis of left hip, unspecified bursa M70.72 726.5       There is no problem list on file for this patient.       Past Medical History:   Diagnosis Date   • Elevated cholesterol    • GERD (gastroesophageal reflux disease)    • Hypertension         Past Surgical History:   Procedure Laterality Date   • BACK SURGERY     • BREAST SURGERY      2 benign breast lumps removed   • COLONOSCOPY N/A 3/26/2018    Procedure: COLONOSCOPY;  Surgeon: Humza Davis DO;  Location: [facility] ENDOSCOPY;  Service: Gastroenterology   • ENDOSCOPY N/A 3/26/2018    Procedure: ESOPHAGOGASTRODUODENOSCOPY;  Surgeon: Humza Davis DO;  Location: [facility] ENDOSCOPY;  Service: Gastroenterology   • LUMBAR DISCECTOMY     • TONSILLECTOMY     • TUBAL ABDOMINAL LIGATION         PT Ortho     Row Name 20 0800       Subjective Comments    Subjective Comments  Pt got a steriod shot yesterday. Doctor agrees with aquatic therapy and continue with low back ex.  -TL       Precautions and Contraindications    Precautions/Limitations  no known precautions/limitations  -TL       Subjective Pain    Able to rate subjective pain?  yes  -TL    Pre-Treatment Pain Level  2  -TL    Post-Treatment Pain Level  0  -TL      User Key  (r) = Recorded By, (t) = Taken By, (c) = Cosigned By    Initials Name Provider Type    Yazmin Farah PTA Physical Therapy Assistant                      PT Assessment/Plan     Row Name 20 1000          PT Assessment    Assessment Comments  pt has met short term goals 1,2 and 4. Pt is progressing with sleeping in bed 6 hours. No new goals met at this time . Pt hip abd  and ER is improving with stretches.  -TL        PT Plan    PT Frequency  2x/week  -TL     Predicted Duration of Therapy Intervention (Therapy Eval)  2-3 weeks  -TL     PT Plan Comments  aquatic therapy next visit  -TL       User Key  (r) = Recorded By, (t) = Taken By, (c) = Cosigned By    Initials Name Provider Type    Yazmin Farah PTA Physical Therapy Assistant          Modalities     Row Name 02/05/20 0800             Ice    Ice Applied  Yes  -TL      Location  left hip and low back  -TL      Rx Minutes  15 mins  -TL      Ice S/P Rx  Yes  -TL      Patient reports will apply ice at home to involved area  Yes  -TL        User Key  (r) = Recorded By, (t) = Taken By, (c) = Cosigned By    Initials Name Provider Type    Yazmin Farah PTA Physical Therapy Assistant        OP Exercises     Row Name 02/05/20 0800             Subjective Comments    Subjective Comments  Pt got a steriod shot yesterday. Doctor agrees with aquatic therapy and continue with low back ex.  -TL         Subjective Pain    Able to rate subjective pain?  yes  -TL      Pre-Treatment Pain Level  2  -TL      Post-Treatment Pain Level  0  -TL         Exercise 1    Exercise Name 1  seated pirif  S  -TL      Reps 1  2  -TL      Time 1  30 sec hold  -TL         Exercise 2    Exercise Name 2  long sitting ham S  -TL      Reps 2  2  -TL      Time 2  30 sec hold both  -TL         Exercise 3    Exercise Name 3  bridging with hip abd GTB  -TL      Sets 3  2  -TL      Reps 3  15  -TL      Time 3  5 sec hold  -TL         Exercise 4    Exercise Name 4  HKM with GTB  -TL      Sets 4  2  -TL      Reps 4  10  -TL      Time 4  5 sec hold  -TL         Exercise 5    Exercise Name 5  hip clams RTB  -TL      Sets 5  2  -TL      Reps 5  10  -TL      Time 5  5 sec hold  -TL         Exercise 6    Exercise Name 6  butterfly S  -TL      Reps 6  3  -TL      Time 6  30 sec hold  -TL         Exercise 7    Exercise Name 7  education on positions that not good /as  well are good /log rolling  -TL        User Key  (r) = Recorded By, (t) = Taken By, (c) = Cosigned By    Initials Name Provider Type    Yazmin Farah PTA Physical Therapy Assistant                       PT OP Goals     Row Name 02/05/20 0900          PT Short Term Goals    STG Date to Achieve  01/29/20  -TL     STG 1  Pt indep with HEP  -TL     STG 1 Progress  Ongoing;Met  -TL     STG 2  Improve L hip flex/abduction MMT to 4+/5  -TL     STG 2 Progress  Met  -TL     STG 3  Improve L hip flex/abd AROM to 90 deg/20 deg respectively  -TL     STG 3 Progress  Ongoing;Progressing  -TL     STG 4  Reduce L hip pain by 25-50% with sleeping and prolonged sitting  -TL     STG 4 Progress  Met  -TL     STG 5  Able to resume sleeping back in her bed at night  -TL     STG 5 Progress  Ongoing;Progressing  -TL        Time Calculation    PT Goal Re-Cert Due Date  02/19/20  -TL       User Key  (r) = Recorded By, (t) = Taken By, (c) = Cosigned By    Initials Name Provider Type    Yazmin Farah PTA Physical Therapy Assistant                         Time Calculation:   Start Time: 0846  Stop Time: 0945  Time Calculation (min): 59 min  PT Non-Billable Time (min): 15 min  Total Timed Code Minutes- PT: 44 minute(s)  Therapy Charges for Today     Code Description Service Date Service Provider Modifiers Qty    51220537426 HC PT THER PROC EA 15 MIN 2/5/2020 Yazmin Sheth PTA GP 3                    Yazmin Sheth PTA  2/5/2020

## 2020-02-10 ENCOUNTER — HOSPITAL ENCOUNTER (OUTPATIENT)
Dept: PHYSICAL THERAPY | Facility: HOSPITAL | Age: 70
Setting detail: THERAPIES SERIES
Discharge: HOME | End: 2020-02-10

## 2020-02-10 DIAGNOSIS — M70.72 BURSITIS OF LEFT HIP, UNSPECIFIED BURSA: Primary | ICD-10-CM

## 2020-02-10 PROCEDURE — 97113 AQUATIC THERAPY/EXERCISES: CPT

## 2020-02-10 NOTE — THERAPY TREATMENT NOTE
Outpatient Physical Therapy Ortho Treatment Note  [facility]     Patient Name: Kathleen Joseph  : 1950  MRN: [id number]  Today's Date: 2/10/2020      Visit Date: 02/10/2020  Subjective Improvement: 70-80%   Visits Attended: 10/10   Visits Approved Medicare 80%   MD visit: TBD   Recert Date: 2020       Visit Dx:    ICD-10-CM ICD-9-CM   1. Bursitis of left hip, unspecified bursa M70.72 726.5       There is no problem list on file for this patient.       Past Medical History:   Diagnosis Date   • Elevated cholesterol    • GERD (gastroesophageal reflux disease)    • Hypertension         Past Surgical History:   Procedure Laterality Date   • BACK SURGERY     • BREAST SURGERY      2 benign breast lumps removed   • COLONOSCOPY N/A 3/26/2018    Procedure: COLONOSCOPY;  Surgeon: Humza Davis DO;  Location: [facility] ENDOSCOPY;  Service: Gastroenterology   • ENDOSCOPY N/A 3/26/2018    Procedure: ESOPHAGOGASTRODUODENOSCOPY;  Surgeon: Humza Davis DO;  Location: [facility] ENDOSCOPY;  Service: Gastroenterology   • LUMBAR DISCECTOMY     • TONSILLECTOMY     • TUBAL ABDOMINAL LIGATION         PT Ortho     Row Name 02/10/20 0900       Subjective Comments    Subjective Comments  Pt reports 70-80% improved.  -TL       Precautions and Contraindications    Precautions/Limitations  no known precautions/limitations  -TL       Subjective Pain    Able to rate subjective pain?  yes  -TL    Pre-Treatment Pain Level  2  -TL    Post-Treatment Pain Level  1  -TL      User Key  (r) = Recorded By, (t) = Taken By, (c) = Cosigned By    Initials Name Provider Type    TL Yazmin Sheth, PTA Physical Therapy Assistant                      PT Assessment/Plan     Row Name 02/10/20 09          PT Assessment    Assessment Comments  pt has met short term goals 1,2,4 and 5. pt reported that she slept in the bed until 500am. Pt progressing well toward goals. Pt reports 70-80% improved  -TL        PT Plan    PT Frequency   2x/week  -TL     Predicted Duration of Therapy Intervention (Therapy Eval)  2-3 weeks  -TL     PT Plan Comments  add bike and scissors next pool session  -TL       User Key  (r) = Recorded By, (t) = Taken By, (c) = Cosigned By    Initials Name Provider Type    Yazmin Farah PTA Physical Therapy Assistant            OP Exercises     Row Name 02/10/20 0900             Subjective Comments    Subjective Comments  Pt reports 70-80% improved.  -TL         Subjective Pain    Able to rate subjective pain?  yes  -TL      Pre-Treatment Pain Level  2  -TL      Post-Treatment Pain Level  1  -TL         Exercise 1    Exercise Name 1  see aquatics flow sheet  -TL        User Key  (r) = Recorded By, (t) = Taken By, (c) = Cosigned By    Initials Name Provider Type    Yazmin Farah PTA Physical Therapy Assistant                       PT OP Goals     Row Name 02/10/20 0900          PT Short Term Goals    STG Date to Achieve  01/29/20  -TL     STG 1  Pt indep with HEP  -TL     STG 1 Progress  Ongoing;Met  -TL     STG 2  Improve L hip flex/abduction MMT to 4+/5  -TL     STG 2 Progress  Met  -TL     STG 3  Improve L hip flex/abd AROM to 90 deg/20 deg respectively  -TL     STG 3 Progress  Ongoing;Progressing  -TL     STG 4  Reduce L hip pain by 25-50% with sleeping and prolonged sitting  -TL     STG 4 Progress  Met  -TL     STG 5  Able to resume sleeping back in her bed at night  -TL     STG 5 Progress  Met  -TL        Time Calculation    PT Goal Re-Cert Due Date  02/19/20  -TL       User Key  (r) = Recorded By, (t) = Taken By, (c) = Cosigned By    Initials Name Provider Type    Yazmin Farah PTA Physical Therapy Assistant          Therapy Education  Given: Posture/body mechanics  Program: Reinforced  How Provided: Verbal, Demonstration  Provided to: Patient  Level of Understanding: Verbalized, Demonstrated              Time Calculation:   Start Time: 0840  Stop Time: 0929  Time Calculation (min): 49 min  Total Timed  Code Minutes- PT: 49 minute(s)  Therapy Charges for Today     Code Description Service Date Service Provider Modifiers Qty    85307735253 HC PT AQUATIC THERAPY EA 15 MIN 2/10/2020 Yazmin Sheth, PTA GP 3                    Yazmin Sheth PTA  2/10/2020

## 2020-02-12 ENCOUNTER — HOSPITAL ENCOUNTER (OUTPATIENT)
Dept: PHYSICAL THERAPY | Facility: HOSPITAL | Age: 70
Setting detail: THERAPIES SERIES
Discharge: HOME | End: 2020-02-12

## 2020-02-12 DIAGNOSIS — M70.72 BURSITIS OF LEFT HIP, UNSPECIFIED BURSA: Primary | ICD-10-CM

## 2020-02-12 PROCEDURE — 97113 AQUATIC THERAPY/EXERCISES: CPT

## 2020-02-12 NOTE — THERAPY TREATMENT NOTE
Outpatient Physical Therapy Ortho Treatment Note  [facility]     Patient Name: Kathleen Joseph  : 1950  MRN: [id number]  Today's Date: 2020      Visit Date: 2020  Subjective Improvement: 80%   Visits Attended:    Visits Approved Medicare 80%   MD visit: TBD   Recert Date: 2019       Visit Dx:    ICD-10-CM ICD-9-CM   1. Bursitis of left hip, unspecified bursa M70.72 726.5       There is no problem list on file for this patient.       Past Medical History:   Diagnosis Date   • Elevated cholesterol    • GERD (gastroesophageal reflux disease)    • Hypertension         Past Surgical History:   Procedure Laterality Date   • BACK SURGERY     • BREAST SURGERY      2 benign breast lumps removed   • COLONOSCOPY N/A 3/26/2018    Procedure: COLONOSCOPY;  Surgeon: Humza Davis DO;  Location: [facility] ENDOSCOPY;  Service: Gastroenterology   • ENDOSCOPY N/A 3/26/2018    Procedure: ESOPHAGOGASTRODUODENOSCOPY;  Surgeon: Humza Davis DO;  Location: [facility] ENDOSCOPY;  Service: Gastroenterology   • LUMBAR DISCECTOMY     • TONSILLECTOMY     • TUBAL ABDOMINAL LIGATION         PT Ortho     Row Name 20 0900       Subjective Comments    Subjective Comments  pt slept in bed again all night. Pt reports doing better.   -TL       Precautions and Contraindications    Precautions/Limitations  no known precautions/limitations  -TL       Subjective Pain    Able to rate subjective pain?  yes  -TL    Pre-Treatment Pain Level  2  -TL    Post-Treatment Pain Level  1  -TL      User Key  (r) = Recorded By, (t) = Taken By, (c) = Cosigned By    Initials Name Provider Type    Yazmin Farah PTA Physical Therapy Assistant                      PT Assessment/Plan     Row Name 20 0900          PT Assessment    Assessment Comments  Pt has met short term goals 1 ,2,4 and 5 . Pt progressing. Next week prepare pt for d/c  -TL        PT Plan    PT Frequency  2x/week  -TL     Predicted Duration of  Therapy Intervention (Therapy Eval)  2-3 weeks  -TL     PT Plan Comments  add flutter kicks in aquatics next visit  -TL       User Key  (r) = Recorded By, (t) = Taken By, (c) = Cosigned By    Initials Name Provider Type    Yazmin Farah PTA Physical Therapy Assistant            OP Exercises     Row Name 02/12/20 0900             Subjective Comments    Subjective Comments  pt slept in bed again all night. Pt reports doing better.   -TL         Subjective Pain    Able to rate subjective pain?  yes  -TL      Pre-Treatment Pain Level  2  -TL      Post-Treatment Pain Level  1  -TL         Exercise 1    Exercise Name 1  see aquatic flow sheet  -TL        User Key  (r) = Recorded By, (t) = Taken By, (c) = Cosigned By    Initials Name Provider Type    Yazmin Farah PTA Physical Therapy Assistant                       PT OP Goals     Row Name 02/12/20 0833          PT Short Term Goals    STG Date to Achieve  01/29/20  -TL     STG 1  Pt indep with HEP  -TL     STG 1 Progress  Ongoing;Met  -TL     STG 2  Improve L hip flex/abduction MMT to 4+/5  -TL     STG 2 Progress  Met  -TL     STG 3  Improve L hip flex/abd AROM to 90 deg/20 deg respectively  -TL     STG 3 Progress  Ongoing;Progressing  -TL     STG 4  Reduce L hip pain by 25-50% with sleeping and prolonged sitting  -TL     STG 4 Progress  Met  -TL     STG 5  Able to resume sleeping back in her bed at night  -TL     STG 5 Progress  Met  -TL        Time Calculation    PT Goal Re-Cert Due Date  02/19/20  -TL       User Key  (r) = Recorded By, (t) = Taken By, (c) = Cosigned By    Initials Name Provider Type    Yazmin Farah PTA Physical Therapy Assistant          Therapy Education  Given: Pain management, Posture/body mechanics, Symptoms/condition management  Program: Reinforced, Progressed  How Provided: Verbal, Demonstration  Provided to: Patient  Level of Understanding: Teach back education performed, Verbalized, Demonstrated              Time  Calculation:   Start Time: 0833  Stop Time: 0930  Time Calculation (min): 57 min  Total Timed Code Minutes- PT: 57 minute(s)  Therapy Charges for Today     Code Description Service Date Service Provider Modifiers Qty    03474505391 HC PT AQUATIC THERAPY EA 15 MIN 2/12/2020 Yazmin Sheth, PTA GP 4                    Yazmin Sheth PTA  2/12/2020

## 2020-02-17 ENCOUNTER — HOSPITAL ENCOUNTER (OUTPATIENT)
Dept: PHYSICAL THERAPY | Facility: HOSPITAL | Age: 70
Setting detail: THERAPIES SERIES
Discharge: HOME | End: 2020-02-17

## 2020-02-17 DIAGNOSIS — M70.72 BURSITIS OF LEFT HIP, UNSPECIFIED BURSA: Primary | ICD-10-CM

## 2020-02-17 PROCEDURE — 97113 AQUATIC THERAPY/EXERCISES: CPT

## 2020-02-17 NOTE — THERAPY TREATMENT NOTE
Outpatient Physical Therapy Ortho Treatment Note  [facility]     Patient Name: Kathleen Joseph  : 1950  MRN: [id number]  Today's Date: 2020      Visit Date: 2020  Subjective Improvement: 80%   Visits Attended:    Visits Approved Medicare    MD visit: [id number]   Recert Date: 2019       Visit Dx:    ICD-10-CM ICD-9-CM   1. Bursitis of left hip, unspecified bursa M70.72 726.5       There is no problem list on file for this patient.       Past Medical History:   Diagnosis Date   • Elevated cholesterol    • GERD (gastroesophageal reflux disease)    • Hypertension         Past Surgical History:   Procedure Laterality Date   • BACK SURGERY     • BREAST SURGERY      2 benign breast lumps removed   • COLONOSCOPY N/A 3/26/2018    Procedure: COLONOSCOPY;  Surgeon: Humza Davis DO;  Location: [facility] ENDOSCOPY;  Service: Gastroenterology   • ENDOSCOPY N/A 3/26/2018    Procedure: ESOPHAGOGASTRODUODENOSCOPY;  Surgeon: Humza Davis DO;  Location: [facility] ENDOSCOPY;  Service: Gastroenterology   • LUMBAR DISCECTOMY     • TONSILLECTOMY     • TUBAL ABDOMINAL LIGATION         PT Ortho     Row Name 20 09       Subjective Comments    Subjective Comments  Pt reports 80% improved. Pt's pain decrease after pool. Pt aware of d/c next visit  -TL       Precautions and Contraindications    Precautions/Limitations  no known precautions/limitations  -TL       Subjective Pain    Able to rate subjective pain?  yes  -TL    Pre-Treatment Pain Level  2  -TL    Post-Treatment Pain Level  1  -TL      User Key  (r) = Recorded By, (t) = Taken By, (c) = Cosigned By    Initials Name Provider Type    Yazmin Farah PTA Physical Therapy Assistant                      PT Assessment/Plan     Row Name 20 0900          PT Assessment    Assessment Comments  Pt reports 80% . Pt agrees with d/c next visit. Pt is sleeping in bed all night.Pt pain has improved. Pt agrees with d/c this date. Pt working  toward short term goals #3. Pt has met all other goals.  -TL        PT Plan    PT Frequency  2x/week  -TL     Predicted Duration of Therapy Intervention (Therapy Eval)  2-3 weeks  -TL     PT Plan Comments  d/c next visit and review HEP  -TL       User Key  (r) = Recorded By, (t) = Taken By, (c) = Cosigned By    Initials Name Provider Type    Yazmin Farah PTA Physical Therapy Assistant            OP Exercises     Row Name 02/17/20 0900             Subjective Comments    Subjective Comments  Pt reports 80% improved. Pt's pain decrease after pool. Pt aware of d/c next visit  -TL         Subjective Pain    Able to rate subjective pain?  yes  -TL      Pre-Treatment Pain Level  2  -TL      Post-Treatment Pain Level  1  -TL         Exercise 1    Exercise Name 1  see aquatic flow sheet.  -TL        User Key  (r) = Recorded By, (t) = Taken By, (c) = Cosigned By    Initials Name Provider Type    Yazmin Farah PTA Physical Therapy Assistant                       PT OP Goals     Row Name 02/17/20 0834          PT Short Term Goals    STG Date to Achieve  01/29/20  -TL     STG 1  Pt indep with HEP  -TL     STG 1 Progress  Ongoing;Met  -TL     STG 2  Improve L hip flex/abduction MMT to 4+/5  -TL     STG 2 Progress  Met  -TL     STG 3  Improve L hip flex/abd AROM to 90 deg/20 deg respectively  -TL     STG 3 Progress  Ongoing;Progressing  -TL     STG 4  Reduce L hip pain by 25-50% with sleeping and prolonged sitting  -TL     STG 4 Progress  Met  -TL     STG 5  Able to resume sleeping back in her bed at night  -TL     STG 5 Progress  Met  -TL       User Key  (r) = Recorded By, (t) = Taken By, (c) = Cosigned By    Initials Name Provider Type    Yazmin Farah PTA Physical Therapy Assistant                         Time Calculation:   Start Time: 0834  Stop Time: 0928  Time Calculation (min): 54 min  Total Timed Code Minutes- PT: 54 minute(s)  Therapy Charges for Today     Code Description Service Date Service  Provider Modifiers Qty    87815054748 HC PT AQUATIC THERAPY EA 15 MIN 2/17/2020 Yazmin Sheth, PTA GP 4                    Yazmin Sheth PTA  2/17/2020

## 2020-02-19 ENCOUNTER — HOSPITAL ENCOUNTER (OUTPATIENT)
Dept: PHYSICAL THERAPY | Facility: HOSPITAL | Age: 70
Setting detail: THERAPIES SERIES
Discharge: HOME | End: 2020-02-19

## 2020-02-19 DIAGNOSIS — M70.72 BURSITIS OF LEFT HIP, UNSPECIFIED BURSA: Primary | ICD-10-CM

## 2020-02-19 PROCEDURE — 97113 AQUATIC THERAPY/EXERCISES: CPT

## 2020-02-19 NOTE — THERAPY DISCHARGE NOTE
Outpatient Physical Therapy Ortho Treatment Note/Discharge Summary  [facility]     Patient Name: Kathleen Joseph  : 1950  MRN: [id number]  Today's Date: 2020      Visit Date: 2020  Subjective Improvement: 80   Visits Attended:    Visits Approved Medicare   MD visit: TBD   Recert Date: n/a       Visit Dx:    ICD-10-CM ICD-9-CM   1. Bursitis of left hip, unspecified bursa M70.72 726.5       There is no problem list on file for this patient.       Past Medical History:   Diagnosis Date   • Elevated cholesterol    • GERD (gastroesophageal reflux disease)    • Hypertension         Past Surgical History:   Procedure Laterality Date   • BACK SURGERY     • BREAST SURGERY      2 benign breast lumps removed   • COLONOSCOPY N/A 3/26/2018    Procedure: COLONOSCOPY;  Surgeon: Humza Davis DO;  Location: [facility] ENDOSCOPY;  Service: Gastroenterology   • ENDOSCOPY N/A 3/26/2018    Procedure: ESOPHAGOGASTRODUODENOSCOPY;  Surgeon: Humza Davis DO;  Location: [facility] ENDOSCOPY;  Service: Gastroenterology   • LUMBAR DISCECTOMY     • TONSILLECTOMY     • TUBAL ABDOMINAL LIGATION         PT Ortho     Row Name 20 0900       Subjective Comments    Subjective Comments  pt agrees with d/c today. pt still at 80% improved.  -TL       Precautions and Contraindications    Precautions/Limitations  no known precautions/limitations  -TL       Subjective Pain    Able to rate subjective pain?  yes  -TL    Pre-Treatment Pain Level  1  -TL    Post-Treatment Pain Level  0  -TL      User Key  (r) = Recorded By, (t) = Taken By, (c) = Cosigned By    Initials Name Provider Type    Yazmin Farah PTA Physical Therapy Assistant                      PT Assessment/Plan     Row Name 20 0865          PT Assessment    Assessment Comments  all goals met this date. pt reports 80% improved. Pt still has some pain but has improved. pt is sleeping in bed.  -TL        PT Plan    PT Frequency  2x/week  -TL      PT Plan Comments  d/c this date.  -TL       User Key  (r) = Recorded By, (t) = Taken By, (c) = Cosigned By    Initials Name Provider Type    Yazmin Farah PTA Physical Therapy Assistant              OP Exercises     Row Name 02/19/20 0900             Subjective Comments    Subjective Comments  pt agrees with d/c today. pt still at 80% improved.  -TL         Subjective Pain    Able to rate subjective pain?  yes  -TL      Pre-Treatment Pain Level  1  -TL      Post-Treatment Pain Level  0  -TL         Exercise 1    Exercise Name 1  see aquatic flow sheet  -TL        User Key  (r) = Recorded By, (t) = Taken By, (c) = Cosigned By    Initials Name Provider Type    Yazmin Farah PTA Physical Therapy Assistant                         PT OP Goals     Row Name 02/19/20 0835          PT Short Term Goals    STG Date to Achieve  01/29/20  -TL     STG 1  Pt indep with HEP  -TL     STG 1 Progress  Ongoing;Met  -TL     STG 2  Improve L hip flex/abduction MMT to 4+/5  -TL     STG 2 Progress  Met  -TL     STG 3  Improve L hip flex/abd AROM to 90 deg/20 deg respectively  -TL     STG 3 Progress  Met  -TL     STG 4  Reduce L hip pain by 25-50% with sleeping and prolonged sitting  -TL     STG 4 Progress  Met  -TL     STG 5  Able to resume sleeping back in her bed at night  -TL     STG 5 Progress  Met  -TL       User Key  (r) = Recorded By, (t) = Taken By, (c) = Cosigned By    Initials Name Provider Type    Yazmin Farah PTA Physical Therapy Assistant          Therapy Education  Education Details: reviewed HEP in aquatics  Given: Pain management, Posture/body mechanics, HEP, Symptoms/condition management  Program: Progressed  How Provided: Verbal, Demonstration  Provided to: Patient  Level of Understanding: Teach back education performed, Verbalized, Demonstrated              Time Calculation:   Start Time: 0835  Stop Time: 0928  Time Calculation (min): 53 min  Total Timed Code Minutes- PT: 53 minute(s)  Therapy  Charges for Today     Code Description Service Date Service Provider Modifiers Qty    22663365197 HC PT AQUATIC THERAPY EA 15 MIN 2/19/2020 Yazmin Sheth, PTA GP 4                OP PT Discharge Summary  Date of Discharge: 02/19/20  Reason for Discharge: All goals achieved  Outcomes Achieved: Able to achieve all goals within established timeline  Discharge Instructions/Additional Comments: pt earned free 30 day to fitness to continue strengthening. Pt verbalized all instructions. PTA encourage pt to call or ask question if any concerns.      Yazmin Sheth, KRISTINA  2/19/2020

## 2021-02-25 ENCOUNTER — IMMUNIZATION (OUTPATIENT)
Dept: VACCINE CLINIC | Facility: HOSPITAL | Age: 71
End: 2021-02-25

## 2021-02-25 PROCEDURE — 0001A: CPT | Performed by: THORACIC SURGERY (CARDIOTHORACIC VASCULAR SURGERY)

## 2021-02-25 PROCEDURE — 91300 HC SARSCOV02 VAC 30MCG/0.3ML IM: CPT | Performed by: THORACIC SURGERY (CARDIOTHORACIC VASCULAR SURGERY)

## 2021-03-18 ENCOUNTER — IMMUNIZATION (OUTPATIENT)
Dept: VACCINE CLINIC | Facility: HOSPITAL | Age: 71
End: 2021-03-18

## 2021-03-18 PROCEDURE — 91300 HC SARSCOV02 VAC 30MCG/0.3ML IM: CPT | Performed by: THORACIC SURGERY (CARDIOTHORACIC VASCULAR SURGERY)

## 2021-03-18 PROCEDURE — 0002A: CPT | Performed by: THORACIC SURGERY (CARDIOTHORACIC VASCULAR SURGERY)

## 2021-03-23 ENCOUNTER — TRANSCRIBE ORDERS (OUTPATIENT)
Dept: ADMINISTRATIVE | Facility: HOSPITAL | Age: 71
End: 2021-03-23

## 2021-03-23 DIAGNOSIS — M79.605 LEFT LEG PAIN: Primary | ICD-10-CM

## 2023-04-19 ENCOUNTER — TRANSCRIBE ORDERS (OUTPATIENT)
Dept: CARDIOLOGY | Facility: CLINIC | Age: 73
End: 2023-04-19
Payer: MEDICARE

## 2023-04-19 DIAGNOSIS — L81.9 DISCOLORATION OF SKIN OF FOOT: Primary | ICD-10-CM

## 2023-04-19 DIAGNOSIS — R09.89 DECREASED PEDAL PULSES: ICD-10-CM

## 2023-05-02 ENCOUNTER — PREP FOR SURGERY (OUTPATIENT)
Dept: OTHER | Facility: HOSPITAL | Age: 73
End: 2023-05-02
Payer: MEDICARE

## 2023-05-02 DIAGNOSIS — Z86.010 PERSONAL HISTORY OF COLONIC POLYPS: Primary | ICD-10-CM

## 2023-05-02 RX ORDER — DEXTROSE AND SODIUM CHLORIDE 5; .45 G/100ML; G/100ML
30 INJECTION, SOLUTION INTRAVENOUS CONTINUOUS PRN
OUTPATIENT
Start: 2023-05-02

## 2023-05-02 RX ORDER — SODIUM CHLORIDE 9 MG/ML
40 INJECTION, SOLUTION INTRAVENOUS AS NEEDED
OUTPATIENT
Start: 2023-05-02

## 2023-05-03 PROBLEM — Z86.010 PERSONAL HISTORY OF COLONIC POLYPS: Status: ACTIVE | Noted: 2023-05-03

## 2023-05-15 ENCOUNTER — ANESTHESIA (OUTPATIENT)
Dept: GASTROENTEROLOGY | Facility: HOSPITAL | Age: 73
End: 2023-05-15
Payer: MEDICARE

## 2023-05-15 ENCOUNTER — ANESTHESIA EVENT (OUTPATIENT)
Dept: GASTROENTEROLOGY | Facility: HOSPITAL | Age: 73
End: 2023-05-15
Payer: MEDICARE

## 2023-05-15 ENCOUNTER — HOSPITAL ENCOUNTER (OUTPATIENT)
Facility: HOSPITAL | Age: 73
Setting detail: HOSPITAL OUTPATIENT SURGERY
Discharge: HOME | End: 2023-05-15
Attending: INTERNAL MEDICINE | Admitting: INTERNAL MEDICINE
Payer: MEDICARE

## 2023-05-15 VITALS
SYSTOLIC BLOOD PRESSURE: 106 MMHG | HEIGHT: 65 IN | WEIGHT: 157.8 LBS | OXYGEN SATURATION: 96 % | BODY MASS INDEX: 26.29 KG/M2 | RESPIRATION RATE: 16 BRPM | TEMPERATURE: 97.7 F | HEART RATE: 77 BPM | DIASTOLIC BLOOD PRESSURE: 72 MMHG

## 2023-05-15 DIAGNOSIS — Z86.010 PERSONAL HISTORY OF COLONIC POLYPS: ICD-10-CM

## 2023-05-15 PROCEDURE — 88305 TISSUE EXAM BY PATHOLOGIST: CPT

## 2023-05-15 PROCEDURE — 25010000002 PROPOFOL 10 MG/ML EMULSION: Performed by: NURSE ANESTHETIST, CERTIFIED REGISTERED

## 2023-05-15 RX ORDER — SODIUM CHLORIDE 9 MG/ML
40 INJECTION, SOLUTION INTRAVENOUS AS NEEDED
Status: DISCONTINUED | OUTPATIENT
Start: 2023-05-15 | End: 2023-05-15 | Stop reason: HOSPADM

## 2023-05-15 RX ORDER — PROPOFOL 10 MG/ML
VIAL (ML) INTRAVENOUS AS NEEDED
Status: DISCONTINUED | OUTPATIENT
Start: 2023-05-15 | End: 2023-05-15 | Stop reason: SURG

## 2023-05-15 RX ORDER — DEXTROSE AND SODIUM CHLORIDE 5; .45 G/100ML; G/100ML
30 INJECTION, SOLUTION INTRAVENOUS CONTINUOUS PRN
Status: DISCONTINUED | OUTPATIENT
Start: 2023-05-15 | End: 2023-05-15 | Stop reason: HOSPADM

## 2023-05-15 RX ORDER — LIDOCAINE HYDROCHLORIDE 20 MG/ML
INJECTION, SOLUTION INTRAVENOUS AS NEEDED
Status: DISCONTINUED | OUTPATIENT
Start: 2023-05-15 | End: 2023-05-15 | Stop reason: SURG

## 2023-05-15 RX ADMIN — PROPOFOL 250 MG: 10 INJECTION, EMULSION INTRAVENOUS at 15:23

## 2023-05-15 RX ADMIN — PROPOFOL 50 MG: 10 INJECTION, EMULSION INTRAVENOUS at 15:21

## 2023-05-15 RX ADMIN — DEXTROSE AND SODIUM CHLORIDE 30 ML/HR: 5; 450 INJECTION, SOLUTION INTRAVENOUS at 14:58

## 2023-05-15 RX ADMIN — LIDOCAINE HYDROCHLORIDE 50 MG: 20 INJECTION, SOLUTION INTRAVENOUS at 15:21

## 2023-05-15 NOTE — ANESTHESIA PREPROCEDURE EVALUATION
Anesthesia Evaluation     Patient summary reviewed and Nursing notes reviewed   history of anesthetic complications: PONV  NPO Solid Status: N/A  NPO Liquid Status: > 2 hours           Airway   Mallampati: II  TM distance: >3 FB  Neck ROM: full  No difficulty expected  Dental - normal exam     Pulmonary - negative pulmonary ROS and normal exam   (-) asthma, not a smoker  Cardiovascular - normal exam    (+) hypertension, hyperlipidemia,   (-) angina, cardiac stents      Neuro/Psych  (+) psychiatric history Depression,    (-) seizures, TIA, CVA  GI/Hepatic/Renal/Endo    (+)  GERD,    (-) diabetes    Musculoskeletal (-) negative ROS    Abdominal  - normal exam   Substance History   (+) alcohol use,      OB/GYN negative ob/gyn ROS         Other - negative ROS                       Anesthesia Plan    ASA 2     general   total IV anesthesia  intravenous induction     Anesthetic plan, risks, benefits, and alternatives have been provided, discussed and informed consent has been obtained with: patient.    Plan discussed with CRNA.

## 2023-05-15 NOTE — ANESTHESIA POSTPROCEDURE EVALUATION
Patient: Kathleen Joseph    Procedure Summary     Date: 05/15/23 Room / Location: [facility] ENDOSCOPY 2 / [facility] ENDOSCOPY    Anesthesia Start: 1515 Anesthesia Stop: 1536    Procedure: COLONOSCOPY 3:00 Diagnosis:       Personal history of colonic polyps      (Personal history of colonic polyps [Z86.010])    Surgeons: Humza Davis DO Provider: Guru Hoffmann CRNA    Anesthesia Type: general ASA Status: 2          Anesthesia Type: general    Vitals  No vitals data found for the desired time range.          Post Anesthesia Care and Evaluation    Patient location during evaluation: PHASE II  Patient participation: complete - patient participated  Level of consciousness: awake  Pain score: 0  Pain management: adequate    Airway patency: patent  Anesthetic complications: No anesthetic complications  PONV Status: none  Cardiovascular status: acceptable  Respiratory status: acceptable, spontaneous ventilation and room air  Hydration status: acceptable  No anesthesia care post op

## 2023-05-15 NOTE — H&P
Arabella Cooper DO,[facility]  Gastroenterology  Hepatology  Endoscopy  Board Certified in Internal Medicine and gastroenterology  [address], [address]  [place]. [address]  - [phone]   F [phone]     GASTROENTEROLOGY HISTORY AND PHYSICAL  NOTE   ARABELLA COOPER DO.         SUBJECTIVE:   5/15/2023    Name: Kathleen Joseph  DOD: 1950      Chief Complaint:       Subjective : Personal history of colon polyps    Patient is 72 y.o. female presents with desire for elective colonoscopy.      ROS/HISTORY/ CURRENT MEDICATIONS/OBJECTIVE/VS/PE:   Review of Systems:  All systems unremarkable unless specified below.  Constitutional   HENT  Eyes   Respiratory    Cardiovascular  Gastrointestinal   Endocrine  Genitourinary    Musculoskeletal   Skin  Allergic/Immunologic    Neurological    Hematological  Psychiatric/Behavioral    History:     Past Medical History:   Diagnosis Date   • Elevated cholesterol    • GERD (gastroesophageal reflux disease)    • Hypertension    • PONV (postoperative nausea and vomiting)      Past Surgical History:   Procedure Laterality Date   • BACK SURGERY     • BREAST SURGERY      2 benign breast lumps removed   • COLONOSCOPY N/A 3/26/2018    Procedure: COLONOSCOPY;  Surgeon: Arabella Cooper DO;  Location: [facility] ENDOSCOPY;  Service: Gastroenterology   • ENDOSCOPY N/A 3/26/2018    Procedure: ESOPHAGOGASTRODUODENOSCOPY;  Surgeon: Arabella Cooper DO;  Location: [facility] ENDOSCOPY;  Service: Gastroenterology   • LUMBAR DISCECTOMY     • TONSILLECTOMY     • TUBAL ABDOMINAL LIGATION       Family History   Problem Relation Age of Onset   • Hyperlipidemia Mother    • Prostate cancer Father      Social History     Tobacco Use   • Smoking status: Never   • Smokeless tobacco: Never   Vaping Use   • Vaping Use: Never used   Substance Use Topics   • Alcohol use: Yes     Alcohol/week: 1.0 standard drink     Types: 1 Glasses of wine per week     Comment: socially   • Drug use:  Defer     Prior to Admission medications    Medication Sig Start Date End Date Taking? Authorizing Provider   escitalopram (LEXAPRO) 10 MG tablet Take 1 tablet by mouth Daily.   Yes Ada Arenas MD   Esomeprazole Magnesium (NEXIUM PO) Take 40 mg by mouth Daily.   Yes Ada Arenas MD   lisinopril (PRINIVIL,ZESTRIL) 5 MG tablet Take 1 tablet by mouth Daily. 6/24/17  Yes Diomedes Lema MD   lovastatin (MEVACOR) 10 MG tablet Take 1 tablet by mouth Every Night.   Yes Ada Arenas MD   triamterene-hydrochlorothiazide (MAXZIDE-25) 37.5-25 MG per tablet Take 1 tablet by mouth Daily.   Yes Ada Arenas MD   aspirin 81 MG EC tablet Take 1 tablet by mouth Daily.    Ada Arenas MD   cyclobenzaprine (FLEXERIL) 10 MG tablet Take 1 tablet by mouth Every 6 (Six) Hours.    Ada Arenas MD   HYDROcodone-acetaminophen (NORCO) 7.5-325 MG per tablet Take 2 tablets by mouth As Needed. 3/22/17   Ada Arenas MD   nitroglycerin (NITROSTAT) 0.4 MG SL tablet Place 1 tablet under the tongue Every 5 (Five) Minutes As Needed for Chest Pain. Take no more than 3 doses in 15 minutes. 6/24/17   Diomedes Lema MD     Allergies:  Penicillins    I have reviewed the patients medical history, surgical history and family history in the available medical record system.     Current Medications:     Current Facility-Administered Medications   Medication Dose Route Frequency Provider Last Rate Last Admin   • dextrose 5 % and sodium chloride 0.45 % infusion  30 mL/hr Intravenous Continuous PRN Humza Davis DO 30 mL/hr at 05/15/23 1458 30 mL/hr at 05/15/23 1458   • sodium chloride 0.9 % infusion 40 mL  40 mL Intravenous PRN Humza Davis DO           Objective     Physical Exam:   Temp:  [98.9 °F (37.2 °C)] 98.9 °F (37.2 °C)  Heart Rate:  [78] 78  Resp:  [18] 18  BP: (139)/(83) 139/83    Physical Exam:  General Appearance:    Alert, cooperative, in no acute distress   Head:     Normocephalic, without obvious abnormality, atraumatic   Eyes:            Lids and lashes normal, conjunctivae and sclerae normal, no icterus, no pallor, corneas clear, PERRLA   Ears:    Ears appear intact with no abnormalities noted   Throat:   No oral lesions, no thrush, oral mucosa moist   Neck:   No adenopathy, supple, trachea midline, no thyromegaly, no  carotid bruit, no JVD   Back:     No kyphosis present, no scoliosis present, no skin lesions,   erythema or scars, no tenderness to percussion or                 palpation,  range of motion normal   Lungs:     Clear to auscultation,respirations regular, even and         unlabored    Heart:    Regular rhythm and normal rate, normal S1 and S2, no  murmur, no gallop, no rub, no click   Breast Exam:    Deferred   Abdomen:     Normal bowel sounds, no masses, no organomegaly, soft  nontender, nondistended, no guarding, no rebound                 tenderness   Genitalia:    Deferred   Extremities:   Moves all extremities well, no edema, no cyanosis, no          redness   Pulses:   Pulses palpable and equal bilaterally   Skin:   No bleeding, bruising or rash   Lymph nodes:   No palpable adenopathy   Neurologic:   Cranial nerves 2 - 12 grossly intact, sensation intact, DTR     present and equal bilaterally      Results Review:     Lab Results   Component Value Date    WBC 6.8 09/16/2019    WBC 5.9 04/10/2019    WBC 7.4 03/05/2018    HGB 13 09/16/2019    HGB 13.1 04/10/2019    HGB 14.3 03/05/2018    HCT 40.3 09/16/2019    HCT 40 04/10/2019    HCT 43.6 03/05/2018     09/16/2019     04/10/2019     03/05/2018             No results found for: LIPASE  Lab Results   Component Value Date    INR 0.91 06/24/2017     No results found for: THROATCX    Radiology Review:  Imaging Results (Last 72 Hours)     ** No results found for the last 72 hours. **           I reviewed the patient's new clinical results.  I reviewed the patient's new imaging results and agree  with the interpretation.     ASSESSMENT/PLAN:   ASSESSMENT:  1.  Personal history of colon polyps    PLAN:  1.  Colonoscopy    Risk and benefits associated with the procedure are reviewed with the patient.  The patient wished to proceed     Humza Davis DO  05/15/23  15:12 CDT

## 2023-05-17 LAB — REF LAB TEST METHOD: NORMAL

## 2023-06-01 ENCOUNTER — TRANSCRIBE ORDERS (OUTPATIENT)
Dept: CARDIOLOGY | Facility: CLINIC | Age: 73
End: 2023-06-01

## 2023-06-01 DIAGNOSIS — R09.89 DECREASED PEDAL PULSES: Primary | ICD-10-CM

## 2023-06-01 DIAGNOSIS — L81.9 DISCOLORATION OF SKIN OF FOOT: ICD-10-CM

## 2024-05-30 NOTE — DISCHARGE INSTRUCTIONS
I discussed the patient's case with the Resident. I agree with the Resident's findings and plan, as documented in today's note.        Outpatient Instructions for Monitored Anesthesia Care (MAC)    1. You will be released from the hospital in the care of a responsible adult who should remain with you for at least 6 hours.    2. You are at an increased risk for falls following anesthesia. Use care when changing from a lying to a sitting position. Use your assistive devices ( example: cane, walker or family member).    3. You must NOT drive a car, climb high places such as a ladder, or operate equipment such as electric knives,stoves etc...for at least 12 hours. If you are dizzy for longer than 24 hours, notify your doctor.    4. DO NOT drink any alcoholic beverages for at least 24 hours. Anesthesia may impair your judgement.    5. If you smoke, do not smoke alone due to increased risk of burns/fires.    6. DO NOT undertake any legally binding commitment for at least 24 hours. Anesthesia may impair your judgement.

## (undated) DEVICE — CANN SMPL SOFTECH BIFLO ETCO2 A/M 7FT

## (undated) DEVICE — SINGLE-USE BIOPSY FORCEPS: Brand: RADIAL JAW 4

## (undated) DEVICE — Device: Brand: DISPOSABLE ELECTROSURGICAL SNARE

## (undated) DEVICE — TRAP SXN POLYP QUICKCATCH LF